# Patient Record
Sex: FEMALE | Race: WHITE | NOT HISPANIC OR LATINO
[De-identification: names, ages, dates, MRNs, and addresses within clinical notes are randomized per-mention and may not be internally consistent; named-entity substitution may affect disease eponyms.]

---

## 2017-02-14 ENCOUNTER — APPOINTMENT (OUTPATIENT)
Dept: OBGYN | Facility: CLINIC | Age: 27
End: 2017-02-14

## 2017-02-14 VITALS
DIASTOLIC BLOOD PRESSURE: 60 MMHG | SYSTOLIC BLOOD PRESSURE: 100 MMHG | HEIGHT: 59 IN | BODY MASS INDEX: 25.2 KG/M2 | WEIGHT: 125 LBS

## 2017-02-14 DIAGNOSIS — Z87.42 PERSONAL HISTORY OF OTHER DISEASES OF THE FEMALE GENITAL TRACT: ICD-10-CM

## 2017-02-15 LAB
C TRACH DNA SPEC QL NAA+PROBE: NORMAL
C TRACH RRNA SPEC QL NAA+PROBE: NORMAL
HBV SURFACE AG SER QL: NONREACTIVE
HCV AB SER QL: NONREACTIVE
HCV S/CO RATIO: 0.11 S/CO
HIV1+2 AB SPEC QL IA.RAPID: NONREACTIVE
N GONORRHOEA DNA SPEC QL NAA+PROBE: NORMAL
N GONORRHOEA RRNA SPEC QL NAA+PROBE: NORMAL
SOURCE AMPLIFICATION: NORMAL
SOURCE TP AMPLIFICATION: NORMAL
T PALLIDUM AB SER QL IA: NEGATIVE
T VAGINALIS RRNA SPEC QL NAA+PROBE: NEGATIVE

## 2017-02-19 LAB — CYTOLOGY CVX/VAG DOC THIN PREP: NORMAL

## 2017-05-06 ENCOUNTER — TRANSCRIPTION ENCOUNTER (OUTPATIENT)
Age: 27
End: 2017-05-06

## 2017-05-12 ENCOUNTER — TRANSCRIPTION ENCOUNTER (OUTPATIENT)
Age: 27
End: 2017-05-12

## 2017-08-15 ENCOUNTER — APPOINTMENT (OUTPATIENT)
Dept: OBGYN | Facility: CLINIC | Age: 27
End: 2017-08-15
Payer: MEDICAID

## 2017-08-15 VITALS
DIASTOLIC BLOOD PRESSURE: 56 MMHG | SYSTOLIC BLOOD PRESSURE: 100 MMHG | BODY MASS INDEX: 24.8 KG/M2 | HEIGHT: 59 IN | WEIGHT: 123 LBS

## 2017-08-15 DIAGNOSIS — Z01.419 ENCOUNTER FOR GYNECOLOGICAL EXAMINATION (GENERAL) (ROUTINE) W/OUT ABNORMAL FINDINGS: ICD-10-CM

## 2017-08-15 LAB
BILIRUB UR QL STRIP: NORMAL
GLUCOSE UR-MCNC: NORMAL
HCG UR QL: 0.2 EU/DL
HCG UR QL: POSITIVE
HGB UR QL STRIP.AUTO: NORMAL
KETONES UR-MCNC: NORMAL
LEUKOCYTE ESTERASE UR QL STRIP: NORMAL
NITRITE UR QL STRIP: NORMAL
PH UR STRIP: 7
PROT UR STRIP-MCNC: NORMAL
QUALITY CONTROL: YES
SP GR UR STRIP: 1.01

## 2017-08-15 PROCEDURE — 81003 URINALYSIS AUTO W/O SCOPE: CPT | Mod: QW

## 2017-08-15 PROCEDURE — 76817 TRANSVAGINAL US OBSTETRIC: CPT

## 2017-08-15 PROCEDURE — 81025 URINE PREGNANCY TEST: CPT

## 2017-08-15 PROCEDURE — 36415 COLL VENOUS BLD VENIPUNCTURE: CPT

## 2017-08-16 LAB
ABO + RH PNL BLD: NORMAL
BLD GP AB SCN SERPL QL: NORMAL
C TRACH RRNA SPEC QL NAA+PROBE: NORMAL
HCG SERPL-MCNC: NORMAL MIU/ML
N GONORRHOEA RRNA SPEC QL NAA+PROBE: NORMAL
PROGEST SERPL-MCNC: 18.1 NG/ML
SOURCE AMPLIFICATION: NORMAL

## 2017-08-17 LAB — BACTERIA UR CULT: ABNORMAL

## 2017-08-29 ENCOUNTER — APPOINTMENT (OUTPATIENT)
Dept: OBGYN | Facility: CLINIC | Age: 27
End: 2017-08-29
Payer: MEDICAID

## 2017-08-29 VITALS
SYSTOLIC BLOOD PRESSURE: 100 MMHG | DIASTOLIC BLOOD PRESSURE: 70 MMHG | HEIGHT: 59 IN | BODY MASS INDEX: 24.8 KG/M2 | WEIGHT: 123 LBS

## 2017-08-29 DIAGNOSIS — O36.80X1 PREGNANCY WITH INCONCLUSIVE FETAL VIABILITY, FETUS 1: ICD-10-CM

## 2017-08-29 LAB
BILIRUB UR QL STRIP: NORMAL
GLUCOSE UR-MCNC: NORMAL
HCG UR QL: 0.2 EU/DL
HGB UR QL STRIP.AUTO: NORMAL
KETONES UR-MCNC: NORMAL
LEUKOCYTE ESTERASE UR QL STRIP: NORMAL
NITRITE UR QL STRIP: NORMAL
PH UR STRIP: 6.5
PROT UR STRIP-MCNC: NORMAL
SP GR UR STRIP: 1.01

## 2017-08-29 PROCEDURE — 0501F PRENATAL FLOW SHEET: CPT

## 2017-08-29 PROCEDURE — 76817 TRANSVAGINAL US OBSTETRIC: CPT

## 2017-08-29 RX ORDER — AMOXICILLIN 500 MG/1
500 TABLET, FILM COATED ORAL
Qty: 14 | Refills: 0 | Status: DISCONTINUED | COMMUNITY
Start: 2017-08-17 | End: 2017-08-29

## 2017-08-30 PROBLEM — O36.80X1 ENCOUNTER TO DETERMINE FETAL VIABILITY OF PREGNANCY, FETUS 1: Status: RESOLVED | Noted: 2017-08-17 | Resolved: 2017-08-30

## 2017-09-15 ENCOUNTER — APPOINTMENT (OUTPATIENT)
Dept: OBGYN | Facility: CLINIC | Age: 27
End: 2017-09-15
Payer: MEDICAID

## 2017-09-15 VITALS
WEIGHT: 122 LBS | BODY MASS INDEX: 24.6 KG/M2 | DIASTOLIC BLOOD PRESSURE: 70 MMHG | HEIGHT: 59 IN | SYSTOLIC BLOOD PRESSURE: 120 MMHG

## 2017-09-15 DIAGNOSIS — Z36 ENCOUNTER FOR ANTENATAL SCREENING OF MOTHER: ICD-10-CM

## 2017-09-15 LAB
BILIRUB UR QL STRIP: NORMAL
GLUCOSE UR-MCNC: NORMAL
HCG UR QL: 0.2 EU/DL
HGB UR QL STRIP.AUTO: NORMAL
KETONES UR-MCNC: NORMAL
LEUKOCYTE ESTERASE UR QL STRIP: NORMAL
NITRITE UR QL STRIP: NORMAL
PH UR STRIP: 7
PROT UR STRIP-MCNC: NORMAL
SP GR UR STRIP: 1.01

## 2017-09-15 PROCEDURE — 76801 OB US < 14 WKS SINGLE FETUS: CPT

## 2017-09-15 PROCEDURE — 0502F SUBSEQUENT PRENATAL CARE: CPT

## 2017-09-19 ENCOUNTER — LABORATORY RESULT (OUTPATIENT)
Age: 27
End: 2017-09-19

## 2017-09-19 ENCOUNTER — APPOINTMENT (OUTPATIENT)
Dept: OBGYN | Facility: CLINIC | Age: 27
End: 2017-09-19
Payer: MEDICAID

## 2017-09-19 VITALS
BODY MASS INDEX: 24.8 KG/M2 | WEIGHT: 123 LBS | DIASTOLIC BLOOD PRESSURE: 58 MMHG | SYSTOLIC BLOOD PRESSURE: 92 MMHG | HEIGHT: 59 IN

## 2017-09-19 PROCEDURE — 76801 OB US < 14 WKS SINGLE FETUS: CPT

## 2017-09-19 PROCEDURE — 90471 IMMUNIZATION ADMIN: CPT

## 2017-09-19 PROCEDURE — 90656 IIV3 VACC NO PRSV 0.5 ML IM: CPT

## 2017-09-19 PROCEDURE — 36415 COLL VENOUS BLD VENIPUNCTURE: CPT

## 2017-09-19 PROCEDURE — 0502F SUBSEQUENT PRENATAL CARE: CPT

## 2017-09-20 LAB
ABO + RH PNL BLD: NORMAL
B19V IGG SER QL IA: 8 INDEX
B19V IGG+IGM SER-IMP: NORMAL
B19V IGG+IGM SER-IMP: POSITIVE
B19V IGM FLD-ACNC: 0.2 INDEX
B19V IGM SER-ACNC: NEGATIVE
BASOPHILS # BLD AUTO: 0.01 K/UL
BASOPHILS NFR BLD AUTO: 0.1 %
BLD GP AB SCN SERPL QL: ABNORMAL
EOSINOPHIL # BLD AUTO: 0.04 K/UL
EOSINOPHIL NFR BLD AUTO: 0.5 %
HBV SURFACE AG SERPL QL IA: NONREACTIVE
HCT VFR BLD CALC: 37.9 %
HCV AB SER QL: NONREACTIVE
HCV S/CO RATIO: 0.08 S/CO
HGB BLD-MCNC: 12.9 G/DL
HIV1+2 AB SPEC QL IA.RAPID: NONREACTIVE
IMM GRANULOCYTES NFR BLD AUTO: 0.1 %
LYMPHOCYTES # BLD AUTO: 1.7 K/UL
LYMPHOCYTES NFR BLD AUTO: 22.3 %
MAN DIFF?: NORMAL
MCHC RBC-ENTMCNC: 27.9 PG
MCHC RBC-ENTMCNC: 34 GM/DL
MCV RBC AUTO: 81.9 FL
MONOCYTES # BLD AUTO: 0.53 K/UL
MONOCYTES NFR BLD AUTO: 6.9 %
NEUTROPHILS # BLD AUTO: 5.35 K/UL
NEUTROPHILS NFR BLD AUTO: 70.1 %
PLATELET # BLD AUTO: 283 K/UL
RBC # BLD: 4.63 M/UL
RBC # FLD: 14.3 %
RUBV IGG FLD-ACNC: 1.6 INDEX
RUBV IGG SER-IMP: POSITIVE
T PALLIDUM AB SER QL IA: NEGATIVE
TSH SERPL-ACNC: 1.45 UIU/ML
VZV AB TITR SER: POSITIVE
VZV IGG SER IF-ACNC: 3411 INDEX
WBC # FLD AUTO: 7.64 K/UL

## 2017-09-21 LAB
HGB A MFR BLD: 97.3 %
HGB A2 MFR BLD: 2.7 %
HGB FRACT BLD-IMP: NORMAL

## 2017-09-22 ENCOUNTER — APPOINTMENT (OUTPATIENT)
Dept: OBGYN | Facility: CLINIC | Age: 27
End: 2017-09-22
Payer: MEDICAID

## 2017-09-22 LAB — LEAD BLD-MCNC: <1 UG/DL

## 2017-09-22 PROCEDURE — 0502F SUBSEQUENT PRENATAL CARE: CPT

## 2017-09-25 ENCOUNTER — RESULT REVIEW (OUTPATIENT)
Age: 27
End: 2017-09-25

## 2017-09-25 LAB — BACTERIA UR CULT: ABNORMAL

## 2017-09-26 ENCOUNTER — APPOINTMENT (OUTPATIENT)
Dept: OBGYN | Facility: CLINIC | Age: 27
End: 2017-09-26
Payer: MEDICAID

## 2017-09-26 PROCEDURE — 0502F SUBSEQUENT PRENATAL CARE: CPT

## 2017-09-27 LAB — BACTERIA UR CULT: ABNORMAL

## 2017-10-05 ENCOUNTER — INPATIENT (INPATIENT)
Facility: HOSPITAL | Age: 27
LOS: 0 days | Discharge: HOME | End: 2017-10-06
Attending: OBSTETRICS & GYNECOLOGY | Admitting: OBSTETRICS & GYNECOLOGY
Payer: MEDICAID

## 2017-10-05 ENCOUNTER — APPOINTMENT (OUTPATIENT)
Dept: OBGYN | Facility: CLINIC | Age: 27
End: 2017-10-05
Payer: MEDICAID

## 2017-10-05 VITALS
RESPIRATION RATE: 16 BRPM | SYSTOLIC BLOOD PRESSURE: 115 MMHG | HEART RATE: 99 BPM | OXYGEN SATURATION: 100 % | TEMPERATURE: 98 F | DIASTOLIC BLOOD PRESSURE: 71 MMHG

## 2017-10-05 DIAGNOSIS — N12 TUBULO-INTERSTITIAL NEPHRITIS, NOT SPECIFIED AS ACUTE OR CHRONIC: ICD-10-CM

## 2017-10-05 LAB
ALBUMIN SERPL ELPH-MCNC: 3.7 G/DL — SIGNIFICANT CHANGE UP (ref 3.3–5)
ALP SERPL-CCNC: 48 U/L — SIGNIFICANT CHANGE UP (ref 40–120)
ALT FLD-CCNC: 16 U/L — SIGNIFICANT CHANGE UP (ref 4–33)
ANTIBODY ID 1_1: SIGNIFICANT CHANGE UP
APPEARANCE UR: CLEAR — SIGNIFICANT CHANGE UP
AST SERPL-CCNC: 17 U/L — SIGNIFICANT CHANGE UP (ref 4–32)
BACTERIA # UR AUTO: SIGNIFICANT CHANGE UP
BASOPHILS # BLD AUTO: 0.02 K/UL — SIGNIFICANT CHANGE UP (ref 0–0.2)
BASOPHILS NFR BLD AUTO: 0.2 % — SIGNIFICANT CHANGE UP (ref 0–2)
BILIRUB SERPL-MCNC: 0.2 MG/DL — SIGNIFICANT CHANGE UP (ref 0.2–1.2)
BILIRUB UR-MCNC: NEGATIVE — SIGNIFICANT CHANGE UP
BLD GP AB SCN SERPL QL: POSITIVE — SIGNIFICANT CHANGE UP
BLOOD UR QL VISUAL: HIGH
BUN SERPL-MCNC: 7 MG/DL — SIGNIFICANT CHANGE UP (ref 7–23)
CALCIUM SERPL-MCNC: 8.3 MG/DL — LOW (ref 8.4–10.5)
CHLORIDE SERPL-SCNC: 105 MMOL/L — SIGNIFICANT CHANGE UP (ref 98–107)
CO2 SERPL-SCNC: 23 MMOL/L — SIGNIFICANT CHANGE UP (ref 22–31)
COLOR SPEC: SIGNIFICANT CHANGE UP
CREAT SERPL-MCNC: 0.48 MG/DL — LOW (ref 0.5–1.3)
DAT POLY-SP REAG RBC QL: NEGATIVE — SIGNIFICANT CHANGE UP
EOSINOPHIL # BLD AUTO: 0.03 K/UL — SIGNIFICANT CHANGE UP (ref 0–0.5)
EOSINOPHIL NFR BLD AUTO: 0.4 % — SIGNIFICANT CHANGE UP (ref 0–6)
GLUCOSE SERPL-MCNC: 101 MG/DL — HIGH (ref 70–99)
GLUCOSE UR-MCNC: NEGATIVE — SIGNIFICANT CHANGE UP
HCG SERPL-ACNC: SIGNIFICANT CHANGE UP MIU/ML
HCT VFR BLD CALC: 36.3 % — SIGNIFICANT CHANGE UP (ref 34.5–45)
HGB BLD-MCNC: 12.5 G/DL — SIGNIFICANT CHANGE UP (ref 11.5–15.5)
IMM GRANULOCYTES # BLD AUTO: 0.02 # — SIGNIFICANT CHANGE UP
IMM GRANULOCYTES NFR BLD AUTO: 0.2 % — SIGNIFICANT CHANGE UP (ref 0–1.5)
KETONES UR-MCNC: NEGATIVE — SIGNIFICANT CHANGE UP
LEUKOCYTE ESTERASE UR-ACNC: SIGNIFICANT CHANGE UP
LYMPHOCYTES # BLD AUTO: 1.57 K/UL — SIGNIFICANT CHANGE UP (ref 1–3.3)
LYMPHOCYTES # BLD AUTO: 19.5 % — SIGNIFICANT CHANGE UP (ref 13–44)
MCHC RBC-ENTMCNC: 28.5 PG — SIGNIFICANT CHANGE UP (ref 27–34)
MCHC RBC-ENTMCNC: 34.4 % — SIGNIFICANT CHANGE UP (ref 32–36)
MCV RBC AUTO: 82.7 FL — SIGNIFICANT CHANGE UP (ref 80–100)
MONOCYTES # BLD AUTO: 0.46 K/UL — SIGNIFICANT CHANGE UP (ref 0–0.9)
MONOCYTES NFR BLD AUTO: 5.7 % — SIGNIFICANT CHANGE UP (ref 2–14)
NEUTROPHILS # BLD AUTO: 5.95 K/UL — SIGNIFICANT CHANGE UP (ref 1.8–7.4)
NEUTROPHILS NFR BLD AUTO: 74 % — SIGNIFICANT CHANGE UP (ref 43–77)
NITRITE UR-MCNC: NEGATIVE — SIGNIFICANT CHANGE UP
NRBC # FLD: 0 — SIGNIFICANT CHANGE UP
PH UR: 7 — SIGNIFICANT CHANGE UP (ref 4.6–8)
PLATELET # BLD AUTO: 266 K/UL — SIGNIFICANT CHANGE UP (ref 150–400)
PMV BLD: 10 FL — SIGNIFICANT CHANGE UP (ref 7–13)
POTASSIUM SERPL-MCNC: 3.3 MMOL/L — LOW (ref 3.5–5.3)
POTASSIUM SERPL-SCNC: 3.3 MMOL/L — LOW (ref 3.5–5.3)
PROT SERPL-MCNC: 6.3 G/DL — SIGNIFICANT CHANGE UP (ref 6–8.3)
PROT UR-MCNC: NEGATIVE — SIGNIFICANT CHANGE UP
RBC # BLD: 4.39 M/UL — SIGNIFICANT CHANGE UP (ref 3.8–5.2)
RBC # FLD: 13.8 % — SIGNIFICANT CHANGE UP (ref 10.3–14.5)
RBC CASTS # UR COMP ASSIST: SIGNIFICANT CHANGE UP (ref 0–?)
RH IG SCN BLD-IMP: NEGATIVE — SIGNIFICANT CHANGE UP
SODIUM SERPL-SCNC: 139 MMOL/L — SIGNIFICANT CHANGE UP (ref 135–145)
SP GR SPEC: 1 — LOW (ref 1–1.03)
SQUAMOUS # UR AUTO: SIGNIFICANT CHANGE UP
UROBILINOGEN FLD QL: NORMAL E.U. — SIGNIFICANT CHANGE UP (ref 0.1–0.2)
WBC # BLD: 8.05 K/UL — SIGNIFICANT CHANGE UP (ref 3.8–10.5)
WBC # FLD AUTO: 8.05 K/UL — SIGNIFICANT CHANGE UP (ref 3.8–10.5)
WBC UR QL: SIGNIFICANT CHANGE UP (ref 0–?)

## 2017-10-05 PROCEDURE — 76775 US EXAM ABDO BACK WALL LIM: CPT | Mod: 26

## 2017-10-05 PROCEDURE — 99211 OFF/OP EST MAY X REQ PHY/QHP: CPT

## 2017-10-05 PROCEDURE — 76805 OB US >/= 14 WKS SNGL FETUS: CPT | Mod: 26

## 2017-10-05 PROCEDURE — 86077 PHYS BLOOD BANK SERV XMATCH: CPT

## 2017-10-05 RX ORDER — CEFTRIAXONE 500 MG/1
1 INJECTION, POWDER, FOR SOLUTION INTRAMUSCULAR; INTRAVENOUS EVERY 24 HOURS
Qty: 0 | Refills: 0 | Status: DISCONTINUED | OUTPATIENT
Start: 2017-10-06 | End: 2017-10-06

## 2017-10-05 RX ORDER — CEFTRIAXONE 500 MG/1
1 INJECTION, POWDER, FOR SOLUTION INTRAMUSCULAR; INTRAVENOUS ONCE
Qty: 0 | Refills: 0 | Status: COMPLETED | OUTPATIENT
Start: 2017-10-05 | End: 2017-10-05

## 2017-10-05 RX ORDER — CEFTRIAXONE 500 MG/1
INJECTION, POWDER, FOR SOLUTION INTRAMUSCULAR; INTRAVENOUS
Qty: 0 | Refills: 0 | Status: DISCONTINUED | OUTPATIENT
Start: 2017-10-06 | End: 2017-10-06

## 2017-10-05 RX ORDER — POTASSIUM CHLORIDE 20 MEQ
40 PACKET (EA) ORAL ONCE
Qty: 0 | Refills: 0 | Status: COMPLETED | OUTPATIENT
Start: 2017-10-05 | End: 2017-10-05

## 2017-10-05 RX ORDER — SODIUM CHLORIDE 9 MG/ML
2000 INJECTION INTRAMUSCULAR; INTRAVENOUS; SUBCUTANEOUS ONCE
Qty: 0 | Refills: 0 | Status: COMPLETED | OUTPATIENT
Start: 2017-10-05 | End: 2017-10-05

## 2017-10-05 RX ORDER — ACETAMINOPHEN 500 MG
975 TABLET ORAL ONCE
Qty: 0 | Refills: 0 | Status: COMPLETED | OUTPATIENT
Start: 2017-10-05 | End: 2017-10-05

## 2017-10-05 RX ADMIN — Medication 975 MILLIGRAM(S): at 22:11

## 2017-10-05 RX ADMIN — Medication 40 MILLIEQUIVALENT(S): at 15:25

## 2017-10-05 RX ADMIN — CEFTRIAXONE 100 GRAM(S): 500 INJECTION, POWDER, FOR SOLUTION INTRAMUSCULAR; INTRAVENOUS at 15:25

## 2017-10-05 RX ADMIN — SODIUM CHLORIDE 1000 MILLILITER(S): 9 INJECTION INTRAMUSCULAR; INTRAVENOUS; SUBCUTANEOUS at 14:12

## 2017-10-05 RX ADMIN — Medication 975 MILLIGRAM(S): at 22:41

## 2017-10-05 NOTE — CONSULT NOTE ADULT - ATTENDING COMMENTS
Late entry: Pt evaluated at approximately 5pm  Pt presents with unilateral back pain and blood when urinates and 12+w gestation.  Has histroy of UTIs and pyelo  Exam +right flank pain  speculum - os appeared closed, cervical vs vaginal polyp noted   TVS per radiology - +IUP, short cervix with funneling 1.3cm  Admit for pyelo  MFM consult in am for short cervix

## 2017-10-05 NOTE — ED PROVIDER NOTE - OBJECTIVE STATEMENT
Dr. Schultz: 26F  13 weeks pregnant sent in from obgyn (Dr. Kebede) for r/o pyelo. Pt had R flank pain and dysuria 3 weeks ago, was given Macrobid x 7 days with no improvement in sx. Abx switched to amoxicillin x 7 days, still no relief. Also c/o 2-3 episodes of NBNB, and hematuria vs vag spotting. Not changing pads, no clots. No fevers or chills.

## 2017-10-05 NOTE — ED ADULT NURSE NOTE - OBJECTIVE STATEMENT
Alert and oriented x 4. Pt received to intake due to vaginal bleeding. Pt denies saturating pads. No chest pain, shortness of breath, nasuea, vomiting or dizziness.  IV 20g to right AC. Labs drawn. VSS. Will continue to monitor.

## 2017-10-05 NOTE — ED PROVIDER NOTE - PROGRESS NOTE DETAILS
Dr. Schultz: Discussed with obgyn resident, pt may need outpt cerclage. Pt's OB attending in the OR, will be down shortly to see patient. Per obgyn resident pt may be dc'ed or CDU, admission unlikely. D/w pt, pt comfortable with CDU. D/w CDU, pt likely to go to CDU if acceptable by obgyn attending.   I had a detailed discussion with pt and her family about her results, and they expressed understanding.

## 2017-10-05 NOTE — CONSULT NOTE ADULT - SUBJECTIVE AND OBJECTIVE BOX
HPI: 26y G1 @12wk6d (RENETTA 4/13/18) presents from the office to rule out pyelonephritis. Pt has had recurrent UTIs throughout her life. This is her third UTI since pregnancy. She has been experiencing symptoms -dysuria, hematuria, flank pain - x 2 weeks. Pt initially took Macrobid without any symptom resolution. She was subsequently placed on Amoxicillin with minimal improvement. Pt also notes that she has had vaginal spotting x several days duration. She denies any associated abdominal pain. She denies any fever/chills, dizziness/lightheadedness, SOB, CP, vomiting. She notes nightly nausea but is able to tolerate PO.    OB/GYN HISTORY:     Name of GYN Physician: Dr. Kebede     OBGYN: +cysts, +HSV, -fibroids, -abn PAP  PMH: recurrent UTIs, pyelo 2015 w/hospital admission for IV ABX  PSH: denies  Meds: PNV, s/p Amoxicillin, s/p Macrobid  Allergies: denies  Social: denies x 3  FHx: UTIs, HTN    ROS wnl, except as per HPI    Vital Signs Last 24 Hrs  T(C): 36.7 (05 Oct 2017 11:45), Max: 36.7 (05 Oct 2017 11:45)  T(F): 98.1 (05 Oct 2017 11:45), Max: 98.1 (05 Oct 2017 11:45)  HR: 99 (05 Oct 2017 11:45) (99 - 99)  BP: 115/71 (05 Oct 2017 11:45) (115/71 - 115/71)  BP(mean): --  RR: 16 (05 Oct 2017 11:45) (16 - 16)  SpO2: 100% (05 Oct 2017 11:45) (100% - 100%)    Physical Exam:  Gen:AAOx3, NAD  CV: RRR  Pulm:CTAB/L  Abd: soft, NT, ND  Back: +R-sided flank pain  Gyn: mass protruding from external os - unable to be removed at bedside; min amount of dark red blood in vaginal vault; cervix open with mass inside cervical canal; no adnexal tenderness; fundus palpated at 12 wks size  Ext: NTB/L    LABS:                RADIOLOGY & ADDITIONAL STUDIES:      A/P:

## 2017-10-06 ENCOUNTER — ASOB RESULT (OUTPATIENT)
Age: 27
End: 2017-10-06

## 2017-10-06 ENCOUNTER — APPOINTMENT (OUTPATIENT)
Dept: ANTEPARTUM | Facility: CLINIC | Age: 27
End: 2017-10-06
Payer: MEDICAID

## 2017-10-06 ENCOUNTER — TRANSCRIPTION ENCOUNTER (OUTPATIENT)
Age: 27
End: 2017-10-06

## 2017-10-06 VITALS
HEART RATE: 77 BPM | TEMPERATURE: 98 F | OXYGEN SATURATION: 98 % | RESPIRATION RATE: 17 BRPM | DIASTOLIC BLOOD PRESSURE: 57 MMHG | SYSTOLIC BLOOD PRESSURE: 98 MMHG

## 2017-10-06 LAB — SPECIMEN SOURCE: SIGNIFICANT CHANGE UP

## 2017-10-06 PROCEDURE — 76813 OB US NUCHAL MEAS 1 GEST: CPT | Mod: 26

## 2017-10-06 PROCEDURE — 76817 TRANSVAGINAL US OBSTETRIC: CPT | Mod: 26

## 2017-10-06 NOTE — DISCHARGE NOTE ANTEPARTUM - HOSPITAL COURSE
Patient admitted from ED with concern for pyelonephritis, workup negative. TVUS revealing cervical polyp encompassing the length of the canal. Patient d/c home HD#2 with close interval follow up with Dr. Kebede.

## 2017-10-06 NOTE — DISCHARGE NOTE ANTEPARTUM - CARE PLAN
Principal Discharge DX:	Cervical polyp  Goal:	Home  Instructions for follow-up, activity and diet:	Normal diet and activity as tolerated

## 2017-10-06 NOTE — DISCHARGE NOTE ANTEPARTUM - CARE PROVIDER_API CALL
Chuy Kebede), Obstetrics and Gynecology  [address] [facility]  [address]nd [place] [address]  Phone: [phone]  Fax: [phone]

## 2017-10-06 NOTE — DISCHARGE NOTE ANTEPARTUM - PATIENT PORTAL LINK FT
“You can access the [employer] Patient Portal, offered by [facility], by registering with the following website: [email or web address]”

## 2017-10-07 LAB
-  AMIKACIN: SIGNIFICANT CHANGE UP
-  AMPICILLIN/SULBACTAM: SIGNIFICANT CHANGE UP
-  AMPICILLIN: SIGNIFICANT CHANGE UP
-  AZTREONAM: SIGNIFICANT CHANGE UP
-  CEFAZOLIN: SIGNIFICANT CHANGE UP
-  CEFEPIME: SIGNIFICANT CHANGE UP
-  CEFOXITIN: SIGNIFICANT CHANGE UP
-  CEFTAZIDIME: SIGNIFICANT CHANGE UP
-  CEFTRIAXONE: SIGNIFICANT CHANGE UP
-  CIPROFLOXACIN: SIGNIFICANT CHANGE UP
-  ERTAPENEM: SIGNIFICANT CHANGE UP
-  GENTAMICIN: SIGNIFICANT CHANGE UP
-  IMIPENEM: SIGNIFICANT CHANGE UP
-  LEVOFLOXACIN: SIGNIFICANT CHANGE UP
-  MEROPENEM: SIGNIFICANT CHANGE UP
-  NITROFURANTOIN: SIGNIFICANT CHANGE UP
-  PIPERACILLIN/TAZOBACTAM: SIGNIFICANT CHANGE UP
-  TIGECYCLINE: SIGNIFICANT CHANGE UP
-  TOBRAMYCIN: SIGNIFICANT CHANGE UP
-  TRIMETHOPRIM/SULFAMETHOXAZOLE: SIGNIFICANT CHANGE UP
BACTERIA UR CULT: SIGNIFICANT CHANGE UP
METHOD TYPE: SIGNIFICANT CHANGE UP
ORGANISM # SPEC MICROSCOPIC CNT: SIGNIFICANT CHANGE UP
ORGANISM # SPEC MICROSCOPIC CNT: SIGNIFICANT CHANGE UP

## 2017-10-08 LAB — BACTERIA UR CULT: ABNORMAL

## 2017-10-17 ENCOUNTER — APPOINTMENT (OUTPATIENT)
Dept: OBGYN | Facility: CLINIC | Age: 27
End: 2017-10-17
Payer: MEDICAID

## 2017-10-17 VITALS
SYSTOLIC BLOOD PRESSURE: 116 MMHG | HEIGHT: 59 IN | BODY MASS INDEX: 25.4 KG/M2 | DIASTOLIC BLOOD PRESSURE: 68 MMHG | WEIGHT: 126 LBS

## 2017-10-17 DIAGNOSIS — Z34.91 ENCOUNTER FOR SUPERVISION OF NORMAL PREGNANCY, UNSPECIFIED, FIRST TRIMESTER: ICD-10-CM

## 2017-10-17 LAB
BILIRUB UR QL STRIP: NORMAL
GLUCOSE UR-MCNC: NORMAL
HCG UR QL: 0.2 EU/DL
HGB UR QL STRIP.AUTO: NORMAL
KETONES UR-MCNC: NORMAL
LEUKOCYTE ESTERASE UR QL STRIP: NORMAL
NITRITE UR QL STRIP: NORMAL
PH UR STRIP: 6.5
PROT UR STRIP-MCNC: NORMAL
SP GR UR STRIP: 1

## 2017-10-17 PROCEDURE — 0502F SUBSEQUENT PRENATAL CARE: CPT

## 2017-10-17 RX ORDER — AMOXICILLIN 500 MG/1
500 TABLET, FILM COATED ORAL
Qty: 14 | Refills: 0 | Status: DISCONTINUED | COMMUNITY
Start: 2017-09-28 | End: 2017-10-17

## 2017-10-17 RX ORDER — CEPHALEXIN 500 MG/1
500 CAPSULE ORAL EVERY 8 HOURS
Qty: 21 | Refills: 0 | Status: DISCONTINUED | COMMUNITY
Start: 2017-10-09 | End: 2017-10-17

## 2017-10-20 LAB — BACTERIA UR CULT: NORMAL

## 2017-10-26 ENCOUNTER — APPOINTMENT (OUTPATIENT)
Dept: ANTEPARTUM | Facility: CLINIC | Age: 27
End: 2017-10-26
Payer: MEDICAID

## 2017-10-26 ENCOUNTER — ASOB RESULT (OUTPATIENT)
Age: 27
End: 2017-10-26

## 2017-10-26 PROCEDURE — 76815 OB US LIMITED FETUS(S): CPT

## 2017-10-26 PROCEDURE — 76817 TRANSVAGINAL US OBSTETRIC: CPT

## 2017-10-28 ENCOUNTER — EMERGENCY (EMERGENCY)
Facility: HOSPITAL | Age: 27
LOS: 1 days | Discharge: STILL ADMITTED OR ADMINISTRATIVE | End: 2017-10-28
Admitting: EMERGENCY MEDICINE

## 2017-10-28 ENCOUNTER — OUTPATIENT (OUTPATIENT)
Dept: OUTPATIENT SERVICES | Facility: HOSPITAL | Age: 27
LOS: 1 days | End: 2017-10-28

## 2017-10-28 VITALS
DIASTOLIC BLOOD PRESSURE: 75 MMHG | RESPIRATION RATE: 16 BRPM | SYSTOLIC BLOOD PRESSURE: 106 MMHG | HEART RATE: 98 BPM | OXYGEN SATURATION: 99 % | TEMPERATURE: 98 F

## 2017-10-28 DIAGNOSIS — O26.899 OTHER SPECIFIED PREGNANCY RELATED CONDITIONS, UNSPECIFIED TRIMESTER: ICD-10-CM

## 2017-10-28 NOTE — ED ADULT TRIAGE NOTE - CHIEF COMPLAINT QUOTE
patient states that she is 16 weeks pregnant c/o vaginal bleeding tonight with blood clots. LMP 7/7/17, due date is 4/13/18

## 2017-10-30 ENCOUNTER — EMERGENCY (EMERGENCY)
Facility: HOSPITAL | Age: 27
LOS: 1 days | Discharge: STILL ADMITTED OR ADMINISTRATIVE | End: 2017-10-30
Admitting: EMERGENCY MEDICINE

## 2017-10-30 ENCOUNTER — INPATIENT (INPATIENT)
Facility: HOSPITAL | Age: 27
LOS: 1 days | Discharge: HOME | End: 2017-11-01
Attending: OBSTETRICS & GYNECOLOGY | Admitting: OBSTETRICS & GYNECOLOGY
Payer: MEDICAID

## 2017-10-30 ENCOUNTER — APPOINTMENT (OUTPATIENT)
Dept: ANTEPARTUM | Facility: HOSPITAL | Age: 27
End: 2017-10-30
Payer: MEDICAID

## 2017-10-30 ENCOUNTER — ASOB RESULT (OUTPATIENT)
Age: 27
End: 2017-10-30

## 2017-10-30 VITALS — WEIGHT: 127.87 LBS | HEIGHT: 59 IN

## 2017-10-30 VITALS
RESPIRATION RATE: 15 BRPM | DIASTOLIC BLOOD PRESSURE: 80 MMHG | TEMPERATURE: 98 F | HEART RATE: 103 BPM | OXYGEN SATURATION: 98 % | SYSTOLIC BLOOD PRESSURE: 122 MMHG

## 2017-10-30 DIAGNOSIS — O26.899 OTHER SPECIFIED PREGNANCY RELATED CONDITIONS, UNSPECIFIED TRIMESTER: ICD-10-CM

## 2017-10-30 LAB
ANTIBODY ID 1_1: SIGNIFICANT CHANGE UP
APTT BLD: 28.6 SEC — SIGNIFICANT CHANGE UP (ref 27.5–37.4)
BASOPHILS # BLD AUTO: 0.02 K/UL — SIGNIFICANT CHANGE UP (ref 0–0.2)
BASOPHILS NFR BLD AUTO: 0.2 % — SIGNIFICANT CHANGE UP (ref 0–2)
BLD GP AB SCN SERPL QL: POSITIVE — SIGNIFICANT CHANGE UP
DAT POLY-SP REAG RBC QL: NEGATIVE — SIGNIFICANT CHANGE UP
EOSINOPHIL # BLD AUTO: 0.1 K/UL — SIGNIFICANT CHANGE UP (ref 0–0.5)
EOSINOPHIL NFR BLD AUTO: 1.2 % — SIGNIFICANT CHANGE UP (ref 0–6)
FIBRINOGEN PPP-MCNC: 614.5 MG/DL — HIGH (ref 310–510)
HCT VFR BLD CALC: 31.9 % — LOW (ref 34.5–45)
HGB BLD-MCNC: 10.8 G/DL — LOW (ref 11.5–15.5)
IMM GRANULOCYTES # BLD AUTO: 0.02 # — SIGNIFICANT CHANGE UP
IMM GRANULOCYTES NFR BLD AUTO: 0.2 % — SIGNIFICANT CHANGE UP (ref 0–1.5)
INR BLD: 1 — SIGNIFICANT CHANGE UP (ref 0.88–1.17)
LYMPHOCYTES # BLD AUTO: 2.02 K/UL — SIGNIFICANT CHANGE UP (ref 1–3.3)
LYMPHOCYTES # BLD AUTO: 23.4 % — SIGNIFICANT CHANGE UP (ref 13–44)
MCHC RBC-ENTMCNC: 28.6 PG — SIGNIFICANT CHANGE UP (ref 27–34)
MCHC RBC-ENTMCNC: 33.9 % — SIGNIFICANT CHANGE UP (ref 32–36)
MCV RBC AUTO: 84.4 FL — SIGNIFICANT CHANGE UP (ref 80–100)
MONOCYTES # BLD AUTO: 0.49 K/UL — SIGNIFICANT CHANGE UP (ref 0–0.9)
MONOCYTES NFR BLD AUTO: 5.7 % — SIGNIFICANT CHANGE UP (ref 2–14)
NEUTROPHILS # BLD AUTO: 6 K/UL — SIGNIFICANT CHANGE UP (ref 1.8–7.4)
NEUTROPHILS NFR BLD AUTO: 69.3 % — SIGNIFICANT CHANGE UP (ref 43–77)
NRBC # FLD: 0 — SIGNIFICANT CHANGE UP
PLATELET # BLD AUTO: 270 K/UL — SIGNIFICANT CHANGE UP (ref 150–400)
PMV BLD: 10.4 FL — SIGNIFICANT CHANGE UP (ref 7–13)
PROTHROM AB SERPL-ACNC: 11.2 SEC — SIGNIFICANT CHANGE UP (ref 9.8–13.1)
RBC # BLD: 3.78 M/UL — LOW (ref 3.8–5.2)
RBC # FLD: 14.1 % — SIGNIFICANT CHANGE UP (ref 10.3–14.5)
RH IG SCN BLD-IMP: NEGATIVE — SIGNIFICANT CHANGE UP
WBC # BLD: 8.65 K/UL — SIGNIFICANT CHANGE UP (ref 3.8–10.5)
WBC # FLD AUTO: 8.65 K/UL — SIGNIFICANT CHANGE UP (ref 3.8–10.5)

## 2017-10-30 PROCEDURE — 76817 TRANSVAGINAL US OBSTETRIC: CPT | Mod: 26

## 2017-10-30 PROCEDURE — 86077 PHYS BLOOD BANK SERV XMATCH: CPT

## 2017-10-30 PROCEDURE — 76815 OB US LIMITED FETUS(S): CPT | Mod: 26

## 2017-10-30 PROCEDURE — 99231 SBSQ HOSP IP/OBS SF/LOW 25: CPT | Mod: 25

## 2017-10-30 NOTE — ED ADULT TRIAGE NOTE - CHIEF COMPLAINT QUOTE
Pt c/o approx 16 weeks pregnant with vaginal bleeding and abdominal cramping.  RENETTA 4/13/18. Spoke with Laura in L&D, pt to go to L&D.

## 2017-10-31 ENCOUNTER — TRANSCRIPTION ENCOUNTER (OUTPATIENT)
Age: 27
End: 2017-10-31

## 2017-10-31 ENCOUNTER — RESULT REVIEW (OUTPATIENT)
Age: 27
End: 2017-10-31

## 2017-10-31 LAB
APTT BLD: 29.3 SEC — SIGNIFICANT CHANGE UP (ref 27.5–37.4)
BLD GP AB SCN SERPL QL: POSITIVE — SIGNIFICANT CHANGE UP
HCT VFR BLD CALC: 30.7 % — LOW (ref 34.5–45)
HGB BLD-MCNC: 10.5 G/DL — LOW (ref 11.5–15.5)
INR BLD: 1.01 — SIGNIFICANT CHANGE UP (ref 0.88–1.17)
MCHC RBC-ENTMCNC: 28.8 PG — SIGNIFICANT CHANGE UP (ref 27–34)
MCHC RBC-ENTMCNC: 34.2 % — SIGNIFICANT CHANGE UP (ref 32–36)
MCV RBC AUTO: 84.3 FL — SIGNIFICANT CHANGE UP (ref 80–100)
NRBC # FLD: 0 — SIGNIFICANT CHANGE UP
PLATELET # BLD AUTO: 230 K/UL — SIGNIFICANT CHANGE UP (ref 150–400)
PMV BLD: 10 FL — SIGNIFICANT CHANGE UP (ref 7–13)
PROTHROM AB SERPL-ACNC: 11.3 SEC — SIGNIFICANT CHANGE UP (ref 9.8–13.1)
RBC # BLD: 3.64 M/UL — LOW (ref 3.8–5.2)
RBC # FLD: 14.4 % — SIGNIFICANT CHANGE UP (ref 10.3–14.5)
RH IG SCN BLD-IMP: NEGATIVE — SIGNIFICANT CHANGE UP
WBC # BLD: 7.94 K/UL — SIGNIFICANT CHANGE UP (ref 3.8–10.5)
WBC # FLD AUTO: 7.94 K/UL — SIGNIFICANT CHANGE UP (ref 3.8–10.5)

## 2017-10-31 PROCEDURE — 88305 TISSUE EXAM BY PATHOLOGIST: CPT | Mod: 26

## 2017-10-31 PROCEDURE — 57452 EXAM OF CERVIX W/SCOPE: CPT

## 2017-10-31 PROCEDURE — 59320 REVISION OF CERVIX: CPT

## 2017-10-31 RX ORDER — INDOMETHACIN 50 MG
25 CAPSULE ORAL EVERY 6 HOURS
Qty: 0 | Refills: 0 | Status: DISCONTINUED | OUTPATIENT
Start: 2017-10-31 | End: 2017-11-01

## 2017-10-31 RX ORDER — METOCLOPRAMIDE HCL 10 MG
10 TABLET ORAL ONCE
Qty: 0 | Refills: 0 | Status: COMPLETED | OUTPATIENT
Start: 2017-10-31 | End: 2017-10-31

## 2017-10-31 RX ORDER — SODIUM CHLORIDE 9 MG/ML
1000 INJECTION, SOLUTION INTRAVENOUS
Qty: 0 | Refills: 0 | Status: DISCONTINUED | OUTPATIENT
Start: 2017-10-31 | End: 2017-11-01

## 2017-10-31 RX ORDER — FAMOTIDINE 10 MG/ML
20 INJECTION INTRAVENOUS ONCE
Qty: 0 | Refills: 0 | Status: COMPLETED | OUTPATIENT
Start: 2017-10-31 | End: 2017-10-31

## 2017-10-31 RX ORDER — CITRIC ACID/SODIUM CITRATE 300-500 MG
30 SOLUTION, ORAL ORAL ONCE
Qty: 0 | Refills: 0 | Status: COMPLETED | OUTPATIENT
Start: 2017-10-31 | End: 2017-10-31

## 2017-10-31 RX ORDER — INDOMETHACIN 50 MG
50 CAPSULE ORAL ONCE
Qty: 0 | Refills: 0 | Status: COMPLETED | OUTPATIENT
Start: 2017-10-31 | End: 2017-10-31

## 2017-10-31 RX ADMIN — Medication 50 MILLIGRAM(S): at 18:13

## 2017-10-31 RX ADMIN — Medication 10 MILLIGRAM(S): at 14:51

## 2017-10-31 RX ADMIN — Medication 50 MILLIGRAM(S): at 18:09

## 2017-10-31 RX ADMIN — FAMOTIDINE 20 MILLIGRAM(S): 10 INJECTION INTRAVENOUS at 14:51

## 2017-10-31 RX ADMIN — SODIUM CHLORIDE 125 MILLILITER(S): 9 INJECTION, SOLUTION INTRAVENOUS at 14:52

## 2017-10-31 RX ADMIN — Medication 30 MILLILITER(S): at 15:02

## 2017-11-01 ENCOUNTER — TRANSCRIPTION ENCOUNTER (OUTPATIENT)
Age: 27
End: 2017-11-01

## 2017-11-01 ENCOUNTER — APPOINTMENT (OUTPATIENT)
Dept: ANTEPARTUM | Facility: HOSPITAL | Age: 27
End: 2017-11-01
Payer: MEDICAID

## 2017-11-01 VITALS
DIASTOLIC BLOOD PRESSURE: 60 MMHG | TEMPERATURE: 98 F | HEART RATE: 103 BPM | RESPIRATION RATE: 18 BRPM | SYSTOLIC BLOOD PRESSURE: 104 MMHG | OXYGEN SATURATION: 100 %

## 2017-11-01 DIAGNOSIS — N93.8 OTHER SPECIFIED ABNORMAL UTERINE AND VAGINAL BLEEDING: ICD-10-CM

## 2017-11-01 LAB
HCT VFR BLD CALC: 28.6 % — LOW (ref 34.5–45)
HGB BLD-MCNC: 9.9 G/DL — LOW (ref 11.5–15.5)
MCHC RBC-ENTMCNC: 29.1 PG — SIGNIFICANT CHANGE UP (ref 27–34)
MCHC RBC-ENTMCNC: 34.6 % — SIGNIFICANT CHANGE UP (ref 32–36)
MCV RBC AUTO: 84.1 FL — SIGNIFICANT CHANGE UP (ref 80–100)
NRBC # FLD: 0 — SIGNIFICANT CHANGE UP
PLATELET # BLD AUTO: 222 K/UL — SIGNIFICANT CHANGE UP (ref 150–400)
PMV BLD: 9.9 FL — SIGNIFICANT CHANGE UP (ref 7–13)
RBC # BLD: 3.4 M/UL — LOW (ref 3.8–5.2)
RBC # FLD: 14.1 % — SIGNIFICANT CHANGE UP (ref 10.3–14.5)
WBC # BLD: 8.04 K/UL — SIGNIFICANT CHANGE UP (ref 3.8–10.5)
WBC # FLD AUTO: 8.04 K/UL — SIGNIFICANT CHANGE UP (ref 3.8–10.5)

## 2017-11-01 PROCEDURE — 76815 OB US LIMITED FETUS(S): CPT | Mod: 26

## 2017-11-01 RX ORDER — INDOMETHACIN 50 MG
1 CAPSULE ORAL
Qty: 120 | Refills: 0 | OUTPATIENT
Start: 2017-11-01 | End: 2017-12-01

## 2017-11-01 RX ORDER — INDOMETHACIN 50 MG
1 CAPSULE ORAL
Qty: 3 | Refills: 0 | OUTPATIENT
Start: 2017-11-01

## 2017-11-01 RX ADMIN — Medication 25 MILLIGRAM(S): at 00:00

## 2017-11-01 RX ADMIN — Medication 25 MILLIGRAM(S): at 00:11

## 2017-11-01 RX ADMIN — Medication 25 MILLIGRAM(S): at 06:20

## 2017-11-01 RX ADMIN — Medication 25 MILLIGRAM(S): at 12:08

## 2017-11-01 RX ADMIN — Medication 25 MILLIGRAM(S): at 06:32

## 2017-11-01 NOTE — DISCHARGE NOTE ANTEPARTUM - MEDICATION SUMMARY - MEDICATIONS TO TAKE
I will START or STAY ON the medications listed below when I get home from the hospital:    indomethacin 25 mg oral capsule  -- 1 cap(s) by mouth every 6 hours  -- Indication: For cramping I will START or STAY ON the medications listed below when I get home from the hospital:    indomethacin 25 mg oral capsule  -- 1 cap(s) by mouth every 6 hours MDD:4  -- Indication: For contraction

## 2017-11-01 NOTE — DISCHARGE NOTE ANTEPARTUM - CARE PLAN
Principal Discharge DX:	Polyp at cervical os  Goal:	healthy pregnancy  Instructions for follow-up, activity and diet:	reg diet, nothing in vagina (no intercourse, no tampons)

## 2017-11-01 NOTE — DISCHARGE NOTE ANTEPARTUM - HOSPITAL COURSE
30yo  admitted at 16wks with heavy vaginal bleeding from an aborting cervical polyp. She underwent cerclage placement and polypectomy on 10/31 without any complications. She was discharged home on  in stable condition.

## 2017-11-01 NOTE — DISCHARGE NOTE ANTEPARTUM - CARE PROVIDER_API CALL
Chuy Kebede), Obstetrics and Gynecology  [address] [facility]  [address]nd [place] [address]  Phone: [phone]  Fax: [phone]

## 2017-11-03 LAB — SURGICAL PATHOLOGY STUDY: SIGNIFICANT CHANGE UP

## 2017-11-07 ENCOUNTER — ASOB RESULT (OUTPATIENT)
Age: 27
End: 2017-11-07

## 2017-11-07 ENCOUNTER — APPOINTMENT (OUTPATIENT)
Dept: ANTEPARTUM | Facility: CLINIC | Age: 27
End: 2017-11-07
Payer: MEDICAID

## 2017-11-07 PROCEDURE — 76817 TRANSVAGINAL US OBSTETRIC: CPT

## 2017-11-07 PROCEDURE — 76815 OB US LIMITED FETUS(S): CPT

## 2017-11-14 ENCOUNTER — APPOINTMENT (OUTPATIENT)
Dept: OBGYN | Facility: CLINIC | Age: 27
End: 2017-11-14
Payer: MEDICAID

## 2017-11-14 VITALS
BODY MASS INDEX: 25.8 KG/M2 | HEIGHT: 59 IN | WEIGHT: 128 LBS | SYSTOLIC BLOOD PRESSURE: 102 MMHG | DIASTOLIC BLOOD PRESSURE: 56 MMHG

## 2017-11-14 PROCEDURE — 36415 COLL VENOUS BLD VENIPUNCTURE: CPT

## 2017-11-14 PROCEDURE — 0502F SUBSEQUENT PRENATAL CARE: CPT

## 2017-11-19 LAB
2ND TRIMESTER DATA: NORMAL
AFP PNL SERPL: NORMAL
AFP SERPL-ACNC: NORMAL
CLINICAL BIOCHEMIST REVIEW: NORMAL
NOTES NTD: NORMAL

## 2017-11-21 ENCOUNTER — APPOINTMENT (OUTPATIENT)
Dept: ANTEPARTUM | Facility: CLINIC | Age: 27
End: 2017-11-21
Payer: MEDICAID

## 2017-11-21 ENCOUNTER — ASOB RESULT (OUTPATIENT)
Age: 27
End: 2017-11-21

## 2017-11-21 PROCEDURE — 76811 OB US DETAILED SNGL FETUS: CPT

## 2017-11-21 PROCEDURE — 76817 TRANSVAGINAL US OBSTETRIC: CPT

## 2017-12-05 ENCOUNTER — APPOINTMENT (OUTPATIENT)
Dept: ANTEPARTUM | Facility: CLINIC | Age: 27
End: 2017-12-05
Payer: MEDICAID

## 2017-12-05 ENCOUNTER — ASOB RESULT (OUTPATIENT)
Age: 27
End: 2017-12-05

## 2017-12-05 PROCEDURE — 76817 TRANSVAGINAL US OBSTETRIC: CPT

## 2017-12-11 ENCOUNTER — TRANSCRIPTION ENCOUNTER (OUTPATIENT)
Age: 27
End: 2017-12-11

## 2017-12-12 ENCOUNTER — APPOINTMENT (OUTPATIENT)
Dept: OBGYN | Facility: CLINIC | Age: 27
End: 2017-12-12
Payer: MEDICAID

## 2017-12-12 VITALS
SYSTOLIC BLOOD PRESSURE: 110 MMHG | HEIGHT: 59 IN | BODY MASS INDEX: 31.65 KG/M2 | DIASTOLIC BLOOD PRESSURE: 64 MMHG | WEIGHT: 157 LBS

## 2017-12-12 VITALS
WEIGHT: 134 LBS | SYSTOLIC BLOOD PRESSURE: 110 MMHG | HEIGHT: 59 IN | BODY MASS INDEX: 27.01 KG/M2 | DIASTOLIC BLOOD PRESSURE: 60 MMHG

## 2017-12-12 LAB
BILIRUB UR QL STRIP: NORMAL
GLUCOSE UR-MCNC: NORMAL
HCG UR QL: 0.2 EU/DL
HGB UR QL STRIP.AUTO: NORMAL
KETONES UR-MCNC: NORMAL
LEUKOCYTE ESTERASE UR QL STRIP: NORMAL
NITRITE UR QL STRIP: NORMAL
PH UR STRIP: 6
PROT UR STRIP-MCNC: NORMAL
SP GR UR STRIP: 1

## 2017-12-12 PROCEDURE — 0502F SUBSEQUENT PRENATAL CARE: CPT

## 2017-12-14 LAB — BACTERIA UR CULT: ABNORMAL

## 2018-01-09 ENCOUNTER — APPOINTMENT (OUTPATIENT)
Dept: OBGYN | Facility: CLINIC | Age: 28
End: 2018-01-09
Payer: MEDICAID

## 2018-01-09 ENCOUNTER — LABORATORY RESULT (OUTPATIENT)
Age: 28
End: 2018-01-09

## 2018-01-09 VITALS
DIASTOLIC BLOOD PRESSURE: 62 MMHG | WEIGHT: 136 LBS | BODY MASS INDEX: 27.42 KG/M2 | SYSTOLIC BLOOD PRESSURE: 110 MMHG | HEIGHT: 59 IN

## 2018-01-09 PROCEDURE — 0502F SUBSEQUENT PRENATAL CARE: CPT

## 2018-01-09 PROCEDURE — 36415 COLL VENOUS BLD VENIPUNCTURE: CPT

## 2018-01-10 LAB
ABO + RH PNL BLD: NORMAL
BASOPHILS # BLD AUTO: 0.01 K/UL
BASOPHILS NFR BLD AUTO: 0.1 %
BLD GP AB SCN SERPL QL: ABNORMAL
EOSINOPHIL # BLD AUTO: 0.12 K/UL
EOSINOPHIL NFR BLD AUTO: 1.1 %
GLUCOSE 1H P 50 G GLC PO SERPL-MCNC: 97 MG/DL
HCT VFR BLD CALC: 34 %
HGB BLD-MCNC: 10.7 G/DL
IMM GRANULOCYTES NFR BLD AUTO: 0.5 %
LYMPHOCYTES # BLD AUTO: 1.6 K/UL
LYMPHOCYTES NFR BLD AUTO: 15.2 %
MAN DIFF?: NORMAL
MCHC RBC-ENTMCNC: 27.8 PG
MCHC RBC-ENTMCNC: 31.5 GM/DL
MCV RBC AUTO: 88.3 FL
MONOCYTES # BLD AUTO: 0.71 K/UL
MONOCYTES NFR BLD AUTO: 6.7 %
NEUTROPHILS # BLD AUTO: 8.03 K/UL
NEUTROPHILS NFR BLD AUTO: 76.4 %
PLATELET # BLD AUTO: 315 K/UL
RBC # BLD: 3.85 M/UL
RBC # FLD: 14.7 %
WBC # FLD AUTO: 10.52 K/UL

## 2018-01-23 ENCOUNTER — APPOINTMENT (OUTPATIENT)
Dept: OBGYN | Facility: CLINIC | Age: 28
End: 2018-01-23
Payer: MEDICAID

## 2018-01-23 VITALS
HEIGHT: 59 IN | WEIGHT: 142 LBS | BODY MASS INDEX: 28.63 KG/M2 | SYSTOLIC BLOOD PRESSURE: 130 MMHG | DIASTOLIC BLOOD PRESSURE: 82 MMHG

## 2018-01-23 DIAGNOSIS — Z34.92 ENCOUNTER FOR SUPERVISION OF NORMAL PREGNANCY, UNSPECIFIED, SECOND TRIMESTER: ICD-10-CM

## 2018-01-23 PROCEDURE — 90471 IMMUNIZATION ADMIN: CPT

## 2018-01-23 PROCEDURE — 0502F SUBSEQUENT PRENATAL CARE: CPT

## 2018-01-23 PROCEDURE — 90715 TDAP VACCINE 7 YRS/> IM: CPT

## 2018-01-23 PROCEDURE — 96372 THER/PROPH/DIAG INJ SC/IM: CPT

## 2018-01-23 RX ORDER — HUMAN RHO(D) IMMUNE GLOBULIN 300 UG/1
1500 INJECTION, SOLUTION INTRAMUSCULAR
Refills: 0 | Status: COMPLETED | OUTPATIENT
Start: 2018-01-23

## 2018-01-23 RX ADMIN — Medication 0 UNIT: at 00:00

## 2018-01-30 ENCOUNTER — ASOB RESULT (OUTPATIENT)
Age: 28
End: 2018-01-30

## 2018-01-30 ENCOUNTER — APPOINTMENT (OUTPATIENT)
Dept: ANTEPARTUM | Facility: CLINIC | Age: 28
End: 2018-01-30
Payer: MEDICAID

## 2018-01-30 PROCEDURE — 76817 TRANSVAGINAL US OBSTETRIC: CPT

## 2018-01-30 PROCEDURE — 76816 OB US FOLLOW-UP PER FETUS: CPT

## 2018-01-30 PROCEDURE — 76819 FETAL BIOPHYS PROFIL W/O NST: CPT

## 2018-02-06 ENCOUNTER — APPOINTMENT (OUTPATIENT)
Dept: OBGYN | Facility: CLINIC | Age: 28
End: 2018-02-06
Payer: MEDICAID

## 2018-02-06 VITALS
WEIGHT: 147 LBS | SYSTOLIC BLOOD PRESSURE: 136 MMHG | BODY MASS INDEX: 29.64 KG/M2 | DIASTOLIC BLOOD PRESSURE: 72 MMHG | HEIGHT: 59 IN

## 2018-02-06 PROCEDURE — 0502F SUBSEQUENT PRENATAL CARE: CPT

## 2018-02-10 LAB — BACTERIA UR CULT: NORMAL

## 2018-02-16 ENCOUNTER — OUTPATIENT (OUTPATIENT)
Dept: OUTPATIENT SERVICES | Facility: HOSPITAL | Age: 28
LOS: 1 days | End: 2018-02-16

## 2018-02-16 DIAGNOSIS — Z3A.00 WEEKS OF GESTATION OF PREGNANCY NOT SPECIFIED: ICD-10-CM

## 2018-02-16 DIAGNOSIS — O26.899 OTHER SPECIFIED PREGNANCY RELATED CONDITIONS, UNSPECIFIED TRIMESTER: ICD-10-CM

## 2018-02-16 LAB
ALBUMIN SERPL ELPH-MCNC: 3.7 G/DL — SIGNIFICANT CHANGE UP (ref 3.3–5)
ALP SERPL-CCNC: 100 U/L — SIGNIFICANT CHANGE UP (ref 40–120)
ALT FLD-CCNC: 25 U/L — SIGNIFICANT CHANGE UP (ref 4–33)
AMYLASE P1 CFR SERPL: 114 U/L — SIGNIFICANT CHANGE UP (ref 25–125)
AST SERPL-CCNC: 25 U/L — SIGNIFICANT CHANGE UP (ref 4–32)
BILIRUB SERPL-MCNC: 0.5 MG/DL — SIGNIFICANT CHANGE UP (ref 0.2–1.2)
BUN SERPL-MCNC: 12 MG/DL — SIGNIFICANT CHANGE UP (ref 7–23)
CALCIUM SERPL-MCNC: 8.6 MG/DL — SIGNIFICANT CHANGE UP (ref 8.4–10.5)
CHLORIDE SERPL-SCNC: 100 MMOL/L — SIGNIFICANT CHANGE UP (ref 98–107)
CO2 SERPL-SCNC: 21 MMOL/L — LOW (ref 22–31)
CREAT SERPL-MCNC: 0.53 MG/DL — SIGNIFICANT CHANGE UP (ref 0.5–1.3)
GLUCOSE SERPL-MCNC: 111 MG/DL — HIGH (ref 70–99)
HCT VFR BLD CALC: 38.4 % — SIGNIFICANT CHANGE UP (ref 34.5–45)
HGB BLD-MCNC: 12.8 G/DL — SIGNIFICANT CHANGE UP (ref 11.5–15.5)
LIDOCAIN IGE QN: 39.9 U/L — SIGNIFICANT CHANGE UP (ref 7–60)
MCHC RBC-ENTMCNC: 27.5 PG — SIGNIFICANT CHANGE UP (ref 27–34)
MCHC RBC-ENTMCNC: 33.3 % — SIGNIFICANT CHANGE UP (ref 32–36)
MCV RBC AUTO: 82.4 FL — SIGNIFICANT CHANGE UP (ref 80–100)
NRBC # FLD: 0 — SIGNIFICANT CHANGE UP
PLATELET # BLD AUTO: 272 K/UL — SIGNIFICANT CHANGE UP (ref 150–400)
PMV BLD: 10.6 FL — SIGNIFICANT CHANGE UP (ref 7–13)
POTASSIUM SERPL-MCNC: 3.7 MMOL/L — SIGNIFICANT CHANGE UP (ref 3.5–5.3)
POTASSIUM SERPL-SCNC: 3.7 MMOL/L — SIGNIFICANT CHANGE UP (ref 3.5–5.3)
PROT SERPL-MCNC: 6.7 G/DL — SIGNIFICANT CHANGE UP (ref 6–8.3)
RBC # BLD: 4.66 M/UL — SIGNIFICANT CHANGE UP (ref 3.8–5.2)
RBC # FLD: 15.9 % — HIGH (ref 10.3–14.5)
SODIUM SERPL-SCNC: 140 MMOL/L — SIGNIFICANT CHANGE UP (ref 135–145)
WBC # BLD: 10.56 K/UL — HIGH (ref 3.8–10.5)
WBC # FLD AUTO: 10.56 K/UL — HIGH (ref 3.8–10.5)

## 2018-02-16 RX ORDER — ONDANSETRON 8 MG/1
8 TABLET, FILM COATED ORAL ONCE
Qty: 0 | Refills: 0 | Status: DISCONTINUED | OUTPATIENT
Start: 2018-02-16 | End: 2018-03-03

## 2018-02-16 RX ORDER — SODIUM CHLORIDE 9 MG/ML
1000 INJECTION, SOLUTION INTRAVENOUS ONCE
Qty: 0 | Refills: 0 | Status: DISCONTINUED | OUTPATIENT
Start: 2018-02-16 | End: 2018-03-03

## 2018-02-23 ENCOUNTER — APPOINTMENT (OUTPATIENT)
Dept: OBGYN | Facility: CLINIC | Age: 28
End: 2018-02-23
Payer: MEDICAID

## 2018-02-23 VITALS
SYSTOLIC BLOOD PRESSURE: 120 MMHG | WEIGHT: 144 LBS | DIASTOLIC BLOOD PRESSURE: 80 MMHG | HEIGHT: 59 IN | BODY MASS INDEX: 29.03 KG/M2

## 2018-02-23 DIAGNOSIS — B37.9 CANDIDIASIS, UNSPECIFIED: ICD-10-CM

## 2018-02-23 DIAGNOSIS — O46.91: ICD-10-CM

## 2018-02-23 PROCEDURE — 0502F SUBSEQUENT PRENATAL CARE: CPT

## 2018-02-23 PROCEDURE — 76816 OB US FOLLOW-UP PER FETUS: CPT

## 2018-02-23 RX ORDER — NITROFURANTOIN MACROCRYSTALS 100 MG/1
100 CAPSULE ORAL
Qty: 90 | Refills: 1 | Status: DISCONTINUED | COMMUNITY
Start: 2017-09-15 | End: 2018-02-23

## 2018-02-23 RX ORDER — TERCONAZOLE 8 MG/G
0.8 CREAM VAGINAL
Qty: 3 | Refills: 0 | Status: DISCONTINUED | COMMUNITY
Start: 2018-01-09 | End: 2018-02-23

## 2018-02-26 LAB
ALBUMIN SERPL ELPH-MCNC: 3.6 G/DL
ALP BLD-CCNC: 117 U/L
ALT SERPL-CCNC: 260 U/L
ANION GAP SERPL CALC-SCNC: 15 MMOL/L
AST SERPL-CCNC: 139 U/L
BASOPHILS # BLD AUTO: 0.01 K/UL
BASOPHILS NFR BLD AUTO: 0.1 %
BILE AC SER-MCNC: 8.9 UMOL/L
BILIRUB SERPL-MCNC: 0.3 MG/DL
BUN SERPL-MCNC: 6 MG/DL
CALCIUM SERPL-MCNC: 9.7 MG/DL
CHLORIDE SERPL-SCNC: 102 MMOL/L
CO2 SERPL-SCNC: 22 MMOL/L
CREAT SERPL-MCNC: 0.54 MG/DL
EOSINOPHIL # BLD AUTO: 0.08 K/UL
EOSINOPHIL NFR BLD AUTO: 0.8 %
GLUCOSE SERPL-MCNC: 100 MG/DL
HCT VFR BLD CALC: 37.9 %
HGB BLD-MCNC: 11.7 G/DL
HIV1+2 AB SPEC QL IA.RAPID: NONREACTIVE
IMM GRANULOCYTES NFR BLD AUTO: 0.6 %
LYMPHOCYTES # BLD AUTO: 1.64 K/UL
LYMPHOCYTES NFR BLD AUTO: 17 %
MAN DIFF?: NORMAL
MCHC RBC-ENTMCNC: 26.6 PG
MCHC RBC-ENTMCNC: 30.9 GM/DL
MCV RBC AUTO: 86.1 FL
MONOCYTES # BLD AUTO: 0.71 K/UL
MONOCYTES NFR BLD AUTO: 7.3 %
NEUTROPHILS # BLD AUTO: 7.16 K/UL
NEUTROPHILS NFR BLD AUTO: 74.2 %
PLATELET # BLD AUTO: 280 K/UL
POTASSIUM SERPL-SCNC: 3.9 MMOL/L
PROT SERPL-MCNC: 6.1 G/DL
RBC # BLD: 4.4 M/UL
RBC # FLD: 15.8 %
SODIUM SERPL-SCNC: 139 MMOL/L
WBC # FLD AUTO: 9.66 K/UL

## 2018-02-27 ENCOUNTER — LABORATORY RESULT (OUTPATIENT)
Age: 28
End: 2018-02-27

## 2018-02-27 ENCOUNTER — APPOINTMENT (OUTPATIENT)
Dept: OBGYN | Facility: CLINIC | Age: 28
End: 2018-02-27
Payer: MEDICAID

## 2018-02-27 VITALS
SYSTOLIC BLOOD PRESSURE: 120 MMHG | HEIGHT: 59 IN | DIASTOLIC BLOOD PRESSURE: 70 MMHG | BODY MASS INDEX: 28.63 KG/M2 | WEIGHT: 142 LBS

## 2018-02-27 LAB
BILIRUB UR QL STRIP: NORMAL
GLUCOSE UR-MCNC: NORMAL
HCG UR QL: 0.2 EU/DL
HGB UR QL STRIP.AUTO: NORMAL
KETONES UR-MCNC: NORMAL
LEUKOCYTE ESTERASE UR QL STRIP: NORMAL
NITRITE UR QL STRIP: NORMAL
PH UR STRIP: 7
PROT UR STRIP-MCNC: NORMAL
SP GR UR STRIP: <1.005

## 2018-02-27 PROCEDURE — 0502F SUBSEQUENT PRENATAL CARE: CPT

## 2018-02-28 LAB
ALBUMIN SERPL ELPH-MCNC: 3.7 G/DL
ALP BLD-CCNC: 118 U/L
ALT SERPL-CCNC: 167 U/L
ANION GAP SERPL CALC-SCNC: 15 MMOL/L
APPEARANCE: CLEAR
APTT BLD: 30.4 SEC
AST SERPL-CCNC: 76 U/L
BACTERIA: NEGATIVE
BASOPHILS # BLD AUTO: 0 K/UL
BASOPHILS NFR BLD AUTO: 0 %
BILIRUB SERPL-MCNC: 0.3 MG/DL
BILIRUBIN URINE: NEGATIVE
BLOOD URINE: NEGATIVE
BUN SERPL-MCNC: 5 MG/DL
CALCIUM SERPL-MCNC: 9.6 MG/DL
CHLORIDE SERPL-SCNC: 100 MMOL/L
CO2 SERPL-SCNC: 23 MMOL/L
COLOR: YELLOW
CREAT SERPL-MCNC: 0.48 MG/DL
EOSINOPHIL # BLD AUTO: 0.16 K/UL
EOSINOPHIL NFR BLD AUTO: 1.7
FIBRINOGEN PPP COAG.DERIVED-MCNC: 852 MG/DL
GLUCOSE QUALITATIVE U: NEGATIVE MG/DL
GLUCOSE SERPL-MCNC: 58 MG/DL
HCT VFR BLD CALC: 38.9 %
HGB BLD-MCNC: 12.3 G/DL
HYALINE CASTS: 4 /LPF
INR PPP: 0.92 RATIO
KETONES URINE: NEGATIVE
LDH SERPL-CCNC: 286 U/L
LEUKOCYTE ESTERASE URINE: NEGATIVE
LYMPHOCYTES # BLD AUTO: 1.56 K/UL
LYMPHOCYTES NFR BLD AUTO: 16.2 %
MAN DIFF?: NORMAL
MCHC RBC-ENTMCNC: 27.7 PG
MCHC RBC-ENTMCNC: 31.6 GM/DL
MCV RBC AUTO: 87.6 FL
MICROSCOPIC-UA: NORMAL
MONOCYTES # BLD AUTO: 0.33 K/UL
MONOCYTES NFR BLD AUTO: 3.4 %
NEUTROPHILS # BLD AUTO: 6.89 K/UL
NEUTROPHILS NFR BLD AUTO: 71.8 %
NITRITE URINE: NEGATIVE
PH URINE: 7
PLATELET # BLD AUTO: 305 K/UL
POTASSIUM SERPL-SCNC: 4.1 MMOL/L
PROT SERPL-MCNC: 7.1 G/DL
PROTEIN URINE: NEGATIVE MG/DL
PT BLD: 10.4 SEC
RBC # BLD: 4.44 M/UL
RBC # FLD: 16.3 %
RED BLOOD CELLS URINE: 1 /HPF
SODIUM SERPL-SCNC: 138 MMOL/L
SPECIFIC GRAVITY URINE: 1
SQUAMOUS EPITHELIAL CELLS: 4 /HPF
URATE SERPL-MCNC: 4.2 MG/DL
UROBILINOGEN URINE: NEGATIVE MG/DL
WBC # FLD AUTO: 9.6 K/UL
WHITE BLOOD CELLS URINE: 0 /HPF

## 2018-03-05 LAB — BILE AC SER-MCNC: 11.4 UMOL/L

## 2018-03-10 ENCOUNTER — APPOINTMENT (OUTPATIENT)
Dept: OBGYN | Facility: CLINIC | Age: 28
End: 2018-03-10
Payer: MEDICAID

## 2018-03-10 VITALS
HEIGHT: 59 IN | DIASTOLIC BLOOD PRESSURE: 80 MMHG | WEIGHT: 147 LBS | SYSTOLIC BLOOD PRESSURE: 120 MMHG | BODY MASS INDEX: 29.64 KG/M2

## 2018-03-10 LAB
BILIRUB UR QL STRIP: NORMAL
GLUCOSE UR-MCNC: NORMAL
HCG UR QL: 0.2 EU/DL
HGB UR QL STRIP.AUTO: NORMAL
KETONES UR-MCNC: NORMAL
LEUKOCYTE ESTERASE UR QL STRIP: NORMAL
NITRITE UR QL STRIP: NORMAL
PH UR STRIP: 7
PROT UR STRIP-MCNC: NORMAL
SP GR UR STRIP: 1.02

## 2018-03-10 PROCEDURE — 0502F SUBSEQUENT PRENATAL CARE: CPT

## 2018-03-11 LAB
ALBUMIN SERPL ELPH-MCNC: 3.6 G/DL
ALP BLD-CCNC: 120 U/L
ALT SERPL-CCNC: 64 U/L
ANION GAP SERPL CALC-SCNC: 15 MMOL/L
APPEARANCE: CLEAR
APTT BLD: 31 SEC
AST SERPL-CCNC: 42 U/L
BACTERIA: NEGATIVE
BASOPHILS # BLD AUTO: 0.01 K/UL
BASOPHILS NFR BLD AUTO: 0.1 %
BILIRUB SERPL-MCNC: 0.2 MG/DL
BILIRUBIN URINE: NEGATIVE
BLOOD URINE: NEGATIVE
BUN SERPL-MCNC: 7 MG/DL
CALCIUM SERPL-MCNC: 9.7 MG/DL
CHLORIDE SERPL-SCNC: 102 MMOL/L
CO2 SERPL-SCNC: 19 MMOL/L
COLOR: YELLOW
CREAT SERPL-MCNC: 0.51 MG/DL
EOSINOPHIL # BLD AUTO: 0.08 K/UL
EOSINOPHIL NFR BLD AUTO: 0.8 %
FIBRINOGEN PPP COAG.DERIVED-MCNC: 854 MG/DL
GLUCOSE QUALITATIVE U: NEGATIVE MG/DL
GLUCOSE SERPL-MCNC: 100 MG/DL
HCT VFR BLD CALC: 38.3 %
HGB BLD-MCNC: 12.1 G/DL
HIV1+2 AB SPEC QL IA.RAPID: NONREACTIVE
HYALINE CASTS: 4 /LPF
IMM GRANULOCYTES NFR BLD AUTO: 0.6 %
INR PPP: 0.96 RATIO
KETONES URINE: NEGATIVE
LDH SERPL-CCNC: 316 U/L
LEUKOCYTE ESTERASE URINE: NEGATIVE
LYMPHOCYTES # BLD AUTO: 1.5 K/UL
LYMPHOCYTES NFR BLD AUTO: 15.7 %
MAN DIFF?: NORMAL
MCHC RBC-ENTMCNC: 26.7 PG
MCHC RBC-ENTMCNC: 31.6 GM/DL
MCV RBC AUTO: 84.5 FL
MICROSCOPIC-UA: NORMAL
MONOCYTES # BLD AUTO: 0.72 K/UL
MONOCYTES NFR BLD AUTO: 7.5 %
NEUTROPHILS # BLD AUTO: 7.2 K/UL
NEUTROPHILS NFR BLD AUTO: 75.3 %
NITRITE URINE: NEGATIVE
PH URINE: 7
PLATELET # BLD AUTO: 274 K/UL
POTASSIUM SERPL-SCNC: 3.7 MMOL/L
PROT SERPL-MCNC: 6.7 G/DL
PROTEIN URINE: NEGATIVE MG/DL
PT BLD: 10.8 SEC
RBC # BLD: 4.53 M/UL
RBC # FLD: 16.6 %
RED BLOOD CELLS URINE: 3 /HPF
SODIUM SERPL-SCNC: 136 MMOL/L
SPECIFIC GRAVITY URINE: 1.01
SQUAMOUS EPITHELIAL CELLS: >27 /HPF
URATE SERPL-MCNC: 4.3 MG/DL
UROBILINOGEN URINE: NEGATIVE MG/DL
WBC # FLD AUTO: 9.57 K/UL
WHITE BLOOD CELLS URINE: 2 /HPF

## 2018-03-15 LAB — BILE AC SER-MCNC: 8.9 UMOL/L

## 2018-03-23 ENCOUNTER — APPOINTMENT (OUTPATIENT)
Dept: OBGYN | Facility: CLINIC | Age: 28
End: 2018-03-23

## 2018-03-23 VITALS
WEIGHT: 150 LBS | HEIGHT: 59 IN | SYSTOLIC BLOOD PRESSURE: 120 MMHG | BODY MASS INDEX: 30.24 KG/M2 | DIASTOLIC BLOOD PRESSURE: 80 MMHG

## 2018-03-23 DIAGNOSIS — R74.0 NONSPECIFIC ELEVATION OF LEVELS OF TRANSAMINASE AND LACTIC ACID DEHYDROGENASE [LDH]: ICD-10-CM

## 2018-03-23 DIAGNOSIS — Z92.29 PERSONAL HISTORY OF OTHER DRUG THERAPY: ICD-10-CM

## 2018-03-23 DIAGNOSIS — Z23 ENCOUNTER FOR IMMUNIZATION: ICD-10-CM

## 2018-03-23 DIAGNOSIS — Z36.4 ENCOUNTER FOR ANTENATAL SCREENING FOR FETAL GROWTH RETARDATION: ICD-10-CM

## 2018-03-23 DIAGNOSIS — A60.00 HERPESVIRAL INFECTION OF UROGENITAL SYSTEM, UNSPECIFIED: ICD-10-CM

## 2018-03-23 DIAGNOSIS — Z87.09 PERSONAL HISTORY OF OTHER DISEASES OF THE RESPIRATORY SYSTEM: ICD-10-CM

## 2018-03-24 PROBLEM — Z87.09 HISTORY OF NASAL CONGESTION: Status: RESOLVED | Noted: 2017-12-29 | Resolved: 2018-03-24

## 2018-03-24 PROBLEM — Z36.4 ULTRASOUND FOR ANTENATAL SCREENING FOR FETAL GROWTH RETARDATION: Status: RESOLVED | Noted: 2018-02-23 | Resolved: 2018-03-24

## 2018-03-24 PROBLEM — Z23 NEED FOR TDAP VACCINATION: Status: RESOLVED | Noted: 2018-01-23 | Resolved: 2018-03-24

## 2018-03-24 PROBLEM — R74.0 TRANSAMINITIS: Status: RESOLVED | Noted: 2018-02-27 | Resolved: 2018-03-24

## 2018-03-24 PROBLEM — Z92.29 HISTORY OF INFLUENZA VACCINATION: Status: RESOLVED | Noted: 2017-09-19 | Resolved: 2018-03-24

## 2018-04-03 ENCOUNTER — APPOINTMENT (OUTPATIENT)
Dept: OBGYN | Facility: CLINIC | Age: 28
End: 2018-04-03
Payer: MEDICAID

## 2018-04-03 VITALS
SYSTOLIC BLOOD PRESSURE: 110 MMHG | WEIGHT: 152 LBS | DIASTOLIC BLOOD PRESSURE: 80 MMHG | BODY MASS INDEX: 30.64 KG/M2 | HEIGHT: 59 IN

## 2018-04-03 PROCEDURE — 0502F SUBSEQUENT PRENATAL CARE: CPT

## 2018-04-10 ENCOUNTER — APPOINTMENT (OUTPATIENT)
Dept: OBGYN | Facility: CLINIC | Age: 28
End: 2018-04-10
Payer: MEDICAID

## 2018-04-10 VITALS
HEIGHT: 59 IN | BODY MASS INDEX: 30.64 KG/M2 | DIASTOLIC BLOOD PRESSURE: 80 MMHG | WEIGHT: 152 LBS | SYSTOLIC BLOOD PRESSURE: 122 MMHG

## 2018-04-10 PROCEDURE — 0501F PRENATAL FLOW SHEET: CPT

## 2018-04-17 ENCOUNTER — APPOINTMENT (OUTPATIENT)
Dept: OBGYN | Facility: CLINIC | Age: 28
End: 2018-04-17
Payer: MEDICAID

## 2018-04-17 VITALS
DIASTOLIC BLOOD PRESSURE: 70 MMHG | HEIGHT: 59 IN | BODY MASS INDEX: 30.84 KG/M2 | WEIGHT: 153 LBS | SYSTOLIC BLOOD PRESSURE: 116 MMHG

## 2018-04-17 PROCEDURE — 0502F SUBSEQUENT PRENATAL CARE: CPT

## 2018-04-17 PROCEDURE — 76816 OB US FOLLOW-UP PER FETUS: CPT

## 2018-04-17 PROCEDURE — 76818 FETAL BIOPHYS PROFILE W/NST: CPT

## 2018-04-22 ENCOUNTER — RX RENEWAL (OUTPATIENT)
Age: 28
End: 2018-04-22

## 2018-04-23 ENCOUNTER — OUTPATIENT (OUTPATIENT)
Dept: OUTPATIENT SERVICES | Facility: HOSPITAL | Age: 28
LOS: 1 days | End: 2018-04-23
Payer: MEDICAID

## 2018-04-23 DIAGNOSIS — O26.899 OTHER SPECIFIED PREGNANCY RELATED CONDITIONS, UNSPECIFIED TRIMESTER: ICD-10-CM

## 2018-04-23 DIAGNOSIS — Z3A.00 WEEKS OF GESTATION OF PREGNANCY NOT SPECIFIED: ICD-10-CM

## 2018-04-23 PROCEDURE — 76818 FETAL BIOPHYS PROFILE W/NST: CPT | Mod: 26

## 2018-04-23 PROCEDURE — 99215 OFFICE O/P EST HI 40 MIN: CPT | Mod: 25

## 2018-04-24 ENCOUNTER — APPOINTMENT (OUTPATIENT)
Dept: OBGYN | Facility: CLINIC | Age: 28
End: 2018-04-24

## 2018-04-24 ENCOUNTER — TRANSCRIPTION ENCOUNTER (OUTPATIENT)
Age: 28
End: 2018-04-24

## 2018-04-24 ENCOUNTER — INPATIENT (INPATIENT)
Facility: HOSPITAL | Age: 28
LOS: 3 days | Discharge: HOME | End: 2018-04-28
Attending: OBSTETRICS & GYNECOLOGY | Admitting: OBSTETRICS & GYNECOLOGY
Payer: MEDICAID

## 2018-04-24 VITALS — HEIGHT: 59 IN | WEIGHT: 158.73 LBS

## 2018-04-24 DIAGNOSIS — O48.0 POST-TERM PREGNANCY: ICD-10-CM

## 2018-04-24 LAB
BASOPHILS # BLD AUTO: 0.01 K/UL — SIGNIFICANT CHANGE UP (ref 0–0.2)
BASOPHILS NFR BLD AUTO: 0.1 % — SIGNIFICANT CHANGE UP (ref 0–2)
BLD GP AB SCN SERPL QL: NEGATIVE — SIGNIFICANT CHANGE UP
EOSINOPHIL # BLD AUTO: 0.05 K/UL — SIGNIFICANT CHANGE UP (ref 0–0.5)
EOSINOPHIL NFR BLD AUTO: 0.6 % — SIGNIFICANT CHANGE UP (ref 0–6)
HCT VFR BLD CALC: 37.1 % — SIGNIFICANT CHANGE UP (ref 34.5–45)
HGB BLD-MCNC: 12.6 G/DL — SIGNIFICANT CHANGE UP (ref 11.5–15.5)
IMM GRANULOCYTES # BLD AUTO: 0.03 # — SIGNIFICANT CHANGE UP
IMM GRANULOCYTES NFR BLD AUTO: 0.4 % — SIGNIFICANT CHANGE UP (ref 0–1.5)
LYMPHOCYTES # BLD AUTO: 1.36 K/UL — SIGNIFICANT CHANGE UP (ref 1–3.3)
LYMPHOCYTES # BLD AUTO: 16.6 % — SIGNIFICANT CHANGE UP (ref 13–44)
MCHC RBC-ENTMCNC: 28.1 PG — SIGNIFICANT CHANGE UP (ref 27–34)
MCHC RBC-ENTMCNC: 34 % — SIGNIFICANT CHANGE UP (ref 32–36)
MCV RBC AUTO: 82.6 FL — SIGNIFICANT CHANGE UP (ref 80–100)
MONOCYTES # BLD AUTO: 0.69 K/UL — SIGNIFICANT CHANGE UP (ref 0–0.9)
MONOCYTES NFR BLD AUTO: 8.4 % — SIGNIFICANT CHANGE UP (ref 2–14)
NEUTROPHILS # BLD AUTO: 6.06 K/UL — SIGNIFICANT CHANGE UP (ref 1.8–7.4)
NEUTROPHILS NFR BLD AUTO: 73.9 % — SIGNIFICANT CHANGE UP (ref 43–77)
NRBC # FLD: 0 — SIGNIFICANT CHANGE UP
PLATELET # BLD AUTO: 222 K/UL — SIGNIFICANT CHANGE UP (ref 150–400)
PMV BLD: 11.1 FL — SIGNIFICANT CHANGE UP (ref 7–13)
RBC # BLD: 4.49 M/UL — SIGNIFICANT CHANGE UP (ref 3.8–5.2)
RBC # FLD: 16.6 % — HIGH (ref 10.3–14.5)
RH IG SCN BLD-IMP: NEGATIVE — SIGNIFICANT CHANGE UP
WBC # BLD: 8.2 K/UL — SIGNIFICANT CHANGE UP (ref 3.8–10.5)
WBC # FLD AUTO: 8.2 K/UL — SIGNIFICANT CHANGE UP (ref 3.8–10.5)

## 2018-04-24 RX ORDER — SODIUM CHLORIDE 9 MG/ML
1000 INJECTION, SOLUTION INTRAVENOUS ONCE
Qty: 0 | Refills: 0 | Status: COMPLETED | OUTPATIENT
Start: 2018-04-24 | End: 2018-04-24

## 2018-04-24 RX ORDER — SODIUM CHLORIDE 9 MG/ML
1000 INJECTION, SOLUTION INTRAVENOUS ONCE
Qty: 0 | Refills: 0 | Status: DISCONTINUED | OUTPATIENT
Start: 2018-04-24 | End: 2018-04-24

## 2018-04-24 RX ORDER — CITRIC ACID/SODIUM CITRATE 300-500 MG
15 SOLUTION, ORAL ORAL EVERY 4 HOURS
Qty: 0 | Refills: 0 | Status: DISCONTINUED | OUTPATIENT
Start: 2018-04-24 | End: 2018-04-25

## 2018-04-24 RX ORDER — AMPICILLIN TRIHYDRATE 250 MG
1 CAPSULE ORAL EVERY 6 HOURS
Qty: 0 | Refills: 0 | Status: DISCONTINUED | OUTPATIENT
Start: 2018-04-25 | End: 2018-04-25

## 2018-04-24 RX ORDER — SODIUM CHLORIDE 9 MG/ML
1000 INJECTION, SOLUTION INTRAVENOUS
Qty: 0 | Refills: 0 | Status: DISCONTINUED | OUTPATIENT
Start: 2018-04-24 | End: 2018-04-25

## 2018-04-24 RX ORDER — AMPICILLIN TRIHYDRATE 250 MG
2 CAPSULE ORAL ONCE
Qty: 0 | Refills: 0 | Status: COMPLETED | OUTPATIENT
Start: 2018-04-24 | End: 2018-04-24

## 2018-04-24 RX ORDER — OXYTOCIN 10 UNIT/ML
333.33 VIAL (ML) INJECTION
Qty: 20 | Refills: 0 | Status: DISCONTINUED | OUTPATIENT
Start: 2018-04-24 | End: 2018-04-24

## 2018-04-24 RX ORDER — CITRIC ACID/SODIUM CITRATE 300-500 MG
15 SOLUTION, ORAL ORAL EVERY 4 HOURS
Qty: 0 | Refills: 0 | Status: DISCONTINUED | OUTPATIENT
Start: 2018-04-24 | End: 2018-04-24

## 2018-04-24 RX ORDER — SODIUM CHLORIDE 9 MG/ML
1000 INJECTION, SOLUTION INTRAVENOUS
Qty: 0 | Refills: 0 | Status: DISCONTINUED | OUTPATIENT
Start: 2018-04-24 | End: 2018-04-24

## 2018-04-24 RX ORDER — OXYTOCIN 10 UNIT/ML
333.33 VIAL (ML) INJECTION
Qty: 20 | Refills: 0 | Status: DISCONTINUED | OUTPATIENT
Start: 2018-04-24 | End: 2018-04-25

## 2018-04-24 RX ORDER — AMPICILLIN TRIHYDRATE 250 MG
CAPSULE ORAL
Qty: 0 | Refills: 0 | Status: DISCONTINUED | OUTPATIENT
Start: 2018-04-24 | End: 2018-04-25

## 2018-04-24 RX ADMIN — SODIUM CHLORIDE 125 MILLILITER(S): 9 INJECTION, SOLUTION INTRAVENOUS at 17:54

## 2018-04-24 RX ADMIN — SODIUM CHLORIDE 2000 MILLILITER(S): 9 INJECTION, SOLUTION INTRAVENOUS at 22:00

## 2018-04-24 RX ADMIN — Medication 216 GRAM(S): at 17:45

## 2018-04-24 RX ADMIN — Medication 208 GRAM(S): at 22:48

## 2018-04-25 ENCOUNTER — TRANSCRIPTION ENCOUNTER (OUTPATIENT)
Age: 28
End: 2018-04-25

## 2018-04-25 LAB — T PALLIDUM AB TITR SER: NEGATIVE — SIGNIFICANT CHANGE UP

## 2018-04-25 PROCEDURE — 59510 CESAREAN DELIVERY: CPT | Mod: U9

## 2018-04-25 RX ORDER — DOCUSATE SODIUM 100 MG
100 CAPSULE ORAL
Qty: 0 | Refills: 0 | Status: DISCONTINUED | OUTPATIENT
Start: 2018-04-25 | End: 2018-04-26

## 2018-04-25 RX ORDER — IBUPROFEN 200 MG
600 TABLET ORAL EVERY 6 HOURS
Qty: 0 | Refills: 0 | Status: COMPLETED | OUTPATIENT
Start: 2018-04-25 | End: 2019-03-24

## 2018-04-25 RX ORDER — TETANUS TOXOID, REDUCED DIPHTHERIA TOXOID AND ACELLULAR PERTUSSIS VACCINE, ADSORBED 5; 2.5; 8; 8; 2.5 [IU]/.5ML; [IU]/.5ML; UG/.5ML; UG/.5ML; UG/.5ML
0.5 SUSPENSION INTRAMUSCULAR ONCE
Qty: 0 | Refills: 0 | Status: DISCONTINUED | OUTPATIENT
Start: 2018-04-25 | End: 2018-04-28

## 2018-04-25 RX ORDER — OXYCODONE HYDROCHLORIDE 5 MG/1
10 TABLET ORAL
Qty: 0 | Refills: 0 | Status: DISCONTINUED | OUTPATIENT
Start: 2018-04-25 | End: 2018-04-26

## 2018-04-25 RX ORDER — NALOXONE HYDROCHLORIDE 4 MG/.1ML
0.1 SPRAY NASAL
Qty: 0 | Refills: 0 | Status: DISCONTINUED | OUTPATIENT
Start: 2018-04-25 | End: 2018-04-26

## 2018-04-25 RX ORDER — DIPHENHYDRAMINE HCL 50 MG
25 CAPSULE ORAL EVERY 6 HOURS
Qty: 0 | Refills: 0 | Status: DISCONTINUED | OUTPATIENT
Start: 2018-04-25 | End: 2018-04-28

## 2018-04-25 RX ORDER — OXYCODONE HYDROCHLORIDE 5 MG/1
5 TABLET ORAL
Qty: 0 | Refills: 0 | Status: DISCONTINUED | OUTPATIENT
Start: 2018-04-25 | End: 2018-04-26

## 2018-04-25 RX ORDER — OXYTOCIN 10 UNIT/ML
41.67 VIAL (ML) INJECTION
Qty: 20 | Refills: 0 | Status: DISCONTINUED | OUTPATIENT
Start: 2018-04-25 | End: 2018-04-26

## 2018-04-25 RX ORDER — AMPICILLIN TRIHYDRATE 250 MG
1 CAPSULE ORAL EVERY 4 HOURS
Qty: 0 | Refills: 0 | Status: DISCONTINUED | OUTPATIENT
Start: 2018-04-25 | End: 2018-04-25

## 2018-04-25 RX ORDER — SIMETHICONE 80 MG/1
80 TABLET, CHEWABLE ORAL EVERY 4 HOURS
Qty: 0 | Refills: 0 | Status: DISCONTINUED | OUTPATIENT
Start: 2018-04-25 | End: 2018-04-28

## 2018-04-25 RX ORDER — OXYTOCIN 10 UNIT/ML
41.67 VIAL (ML) INJECTION
Qty: 20 | Refills: 0 | Status: DISCONTINUED | OUTPATIENT
Start: 2018-04-25 | End: 2018-04-25

## 2018-04-25 RX ORDER — HYDROMORPHONE HYDROCHLORIDE 2 MG/ML
1 INJECTION INTRAMUSCULAR; INTRAVENOUS; SUBCUTANEOUS
Qty: 0 | Refills: 0 | Status: DISCONTINUED | OUTPATIENT
Start: 2018-04-25 | End: 2018-04-26

## 2018-04-25 RX ORDER — OXYTOCIN 10 UNIT/ML
333.33 VIAL (ML) INJECTION
Qty: 20 | Refills: 0 | Status: DISCONTINUED | OUTPATIENT
Start: 2018-04-25 | End: 2018-04-26

## 2018-04-25 RX ORDER — OXYCODONE HYDROCHLORIDE 5 MG/1
5 TABLET ORAL EVERY 4 HOURS
Qty: 0 | Refills: 0 | Status: COMPLETED | OUTPATIENT
Start: 2018-04-25 | End: 2018-05-02

## 2018-04-25 RX ORDER — ONDANSETRON 8 MG/1
4 TABLET, FILM COATED ORAL ONCE
Qty: 0 | Refills: 0 | Status: DISCONTINUED | OUTPATIENT
Start: 2018-04-25 | End: 2018-04-26

## 2018-04-25 RX ORDER — OXYTOCIN 10 UNIT/ML
2 VIAL (ML) INJECTION
Qty: 30 | Refills: 0 | Status: DISCONTINUED | OUTPATIENT
Start: 2018-04-25 | End: 2018-04-25

## 2018-04-25 RX ORDER — SODIUM CHLORIDE 9 MG/ML
1000 INJECTION, SOLUTION INTRAVENOUS
Qty: 0 | Refills: 0 | Status: DISCONTINUED | OUTPATIENT
Start: 2018-04-25 | End: 2018-04-25

## 2018-04-25 RX ORDER — ONDANSETRON 8 MG/1
4 TABLET, FILM COATED ORAL EVERY 6 HOURS
Qty: 0 | Refills: 0 | Status: DISCONTINUED | OUTPATIENT
Start: 2018-04-25 | End: 2018-04-26

## 2018-04-25 RX ORDER — CITRIC ACID/SODIUM CITRATE 300-500 MG
15 SOLUTION, ORAL ORAL EVERY 4 HOURS
Qty: 0 | Refills: 0 | Status: DISCONTINUED | OUTPATIENT
Start: 2018-04-25 | End: 2018-04-25

## 2018-04-25 RX ORDER — MORPHINE SULFATE 50 MG/1
0.15 CAPSULE, EXTENDED RELEASE ORAL ONCE
Qty: 0 | Refills: 0 | Status: DISCONTINUED | OUTPATIENT
Start: 2018-04-25 | End: 2018-04-26

## 2018-04-25 RX ORDER — OXYCODONE HYDROCHLORIDE 5 MG/1
5 TABLET ORAL
Qty: 0 | Refills: 0 | Status: COMPLETED | OUTPATIENT
Start: 2018-04-25 | End: 2018-05-02

## 2018-04-25 RX ORDER — KETOROLAC TROMETHAMINE 30 MG/ML
30 SYRINGE (ML) INJECTION EVERY 6 HOURS
Qty: 0 | Refills: 0 | Status: DISCONTINUED | OUTPATIENT
Start: 2018-04-25 | End: 2018-04-26

## 2018-04-25 RX ORDER — ACETAMINOPHEN 500 MG
975 TABLET ORAL EVERY 6 HOURS
Qty: 0 | Refills: 0 | Status: DISCONTINUED | OUTPATIENT
Start: 2018-04-25 | End: 2018-04-28

## 2018-04-25 RX ORDER — SODIUM CHLORIDE 9 MG/ML
1000 INJECTION, SOLUTION INTRAVENOUS
Qty: 0 | Refills: 0 | Status: DISCONTINUED | OUTPATIENT
Start: 2018-04-25 | End: 2018-04-26

## 2018-04-25 RX ORDER — OXYTOCIN 10 UNIT/ML
333.33 VIAL (ML) INJECTION
Qty: 20 | Refills: 0 | Status: DISCONTINUED | OUTPATIENT
Start: 2018-04-25 | End: 2018-04-25

## 2018-04-25 RX ORDER — MORPHINE SULFATE 50 MG/1
4 CAPSULE, EXTENDED RELEASE ORAL
Qty: 0 | Refills: 0 | Status: DISCONTINUED | OUTPATIENT
Start: 2018-04-25 | End: 2018-04-26

## 2018-04-25 RX ORDER — LANOLIN
1 OINTMENT (GRAM) TOPICAL
Qty: 0 | Refills: 0 | Status: DISCONTINUED | OUTPATIENT
Start: 2018-04-25 | End: 2018-04-28

## 2018-04-25 RX ORDER — FERROUS SULFATE 325(65) MG
325 TABLET ORAL DAILY
Qty: 0 | Refills: 0 | Status: DISCONTINUED | OUTPATIENT
Start: 2018-04-25 | End: 2018-04-26

## 2018-04-25 RX ORDER — HEPARIN SODIUM 5000 [USP'U]/ML
5000 INJECTION INTRAVENOUS; SUBCUTANEOUS EVERY 12 HOURS
Qty: 0 | Refills: 0 | Status: DISCONTINUED | OUTPATIENT
Start: 2018-04-25 | End: 2018-04-28

## 2018-04-25 RX ORDER — GLYCERIN ADULT
1 SUPPOSITORY, RECTAL RECTAL AT BEDTIME
Qty: 0 | Refills: 0 | Status: DISCONTINUED | OUTPATIENT
Start: 2018-04-25 | End: 2018-04-28

## 2018-04-25 RX ADMIN — SODIUM CHLORIDE 125 MILLILITER(S): 9 INJECTION, SOLUTION INTRAVENOUS at 06:40

## 2018-04-25 RX ADMIN — Medication 2 MILLIUNIT(S)/MIN: at 10:07

## 2018-04-25 RX ADMIN — Medication 125 MILLIUNIT(S)/MIN: at 15:56

## 2018-04-25 RX ADMIN — Medication 108 GRAM(S): at 08:03

## 2018-04-25 RX ADMIN — Medication 108 GRAM(S): at 12:09

## 2018-04-25 RX ADMIN — Medication 208 GRAM(S): at 02:40

## 2018-04-25 RX ADMIN — Medication 30 MILLIGRAM(S): at 20:44

## 2018-04-25 RX ADMIN — HEPARIN SODIUM 5000 UNIT(S): 5000 INJECTION INTRAVENOUS; SUBCUTANEOUS at 23:34

## 2018-04-25 NOTE — PROVIDER CONTACT NOTE (OTHER) - RECOMMENDATIONS
Sit patient up and continue with incentive spirometer. Notify of any changes. Monitor BP and notify if BP remains in 140s.

## 2018-04-25 NOTE — DISCHARGE NOTE OB - CARE PROVIDER_API CALL
Chuy Kebede), Obstetrics and Gynecology  [address] [facility]  [address]nd [place] [address]  Phone: [phone]  Fax: [phone]

## 2018-04-25 NOTE — PROVIDER CONTACT NOTE (OTHER) - SITUATION
Pt s/p c/s SPO2 90-93 on room air. /84 at 1730. MD Claudio notified and at bedside to assess patient.

## 2018-04-25 NOTE — DISCHARGE NOTE OB - CARE PLAN
Principal Discharge DX:	 delivery delivered  Goal:	recovery  Assessment and plan of treatment:	pelvic rest, regular diet

## 2018-04-25 NOTE — DISCHARGE NOTE OB - MEDICATION SUMMARY - MEDICATIONS TO TAKE
I will START or STAY ON the medications listed below when I get home from the hospital:    acetaminophen 325 mg oral tablet  -- 3 tab(s) by mouth every 6 hours  -- Indication: For Pain    ibuprofen 600 mg oral tablet  -- 1 tab(s) by mouth every 6 hours  -- Indication: For Pain    Prenatal Multivitamins with Folic Acid 1 mg oral tablet  -- 1 tab(s) by mouth once a day  -- Indication: For Post partum

## 2018-04-25 NOTE — DISCHARGE NOTE OB - PATIENT PORTAL LINK FT
You can access the [facility] Patient Portal, offered by [facility], by registering with the following website: [email or web address]

## 2018-04-25 NOTE — CHART NOTE - NSCHARTNOTEFT_GEN_A_CORE
R2 Note    Called to bedside to evaluate pt for O2 saturation of 91-93% on RA. Pt denies any SOB, CP, n/v, fever/chills. Pt complains of being "sore" but otherwise has no complaints.     Vital Signs Last 24 Hrs  T(C): 37.6 (2018 15:30), Max: 37.6 (2018 15:30)  T(F): 99.7 (2018 15:30), Max: 99.7 (2018 15:30)  HR: 95 (2018 16:45) (95 - 115)  BP: 138/85 (2018 16:45) (127/83 - 149/64)  BP(mean): 95 (2018 16:45) (81 - 95)  RR: 21 (2018 16:45) (16 - 26)  SpO2: 92% (2018 16:45) (92% - 96%)    Gen:AAOx3  CV:RRR  Pulm:CTAB/L  Abd:soft, appropriately tender, ND, fundus firm  Gyn: small amount of blood on pad  Ext:b/l LE edema, no calf tenderness    A/P: 28 y/o  s/p pLTCS for arrest of dilation with decreased O2 saturation in PACU, otherwise asymptomatic. Low O2 saturation likely 2/2 atelectasis.  -pt repositioned/placed into sitting position  -IS provided, pt counseled on use - O2 saturation increased to 95-97% at bedside during evaluation  -continue to monitor VS    d/w Dr. Delio Boucher, pgy2

## 2018-04-25 NOTE — DISCHARGE NOTE OB - MATERIALS PROVIDED
[facility] Hearing Screen Program/[facility] [place] Screening Program/Bottle Feeding Log/Tdap Vaccination (VIS Pub Date: 2012)/[place]  Immunization Record/Vaccinations/Guide to Postpartum Care

## 2018-04-26 LAB
HCT VFR BLD CALC: 29 % — LOW (ref 34.5–45)
HGB BLD-MCNC: 9.6 G/DL — LOW (ref 11.5–15.5)
MCHC RBC-ENTMCNC: 27.8 PG — SIGNIFICANT CHANGE UP (ref 27–34)
MCHC RBC-ENTMCNC: 33.1 % — SIGNIFICANT CHANGE UP (ref 32–36)
MCV RBC AUTO: 84.1 FL — SIGNIFICANT CHANGE UP (ref 80–100)
NRBC # FLD: 0 — SIGNIFICANT CHANGE UP
PLATELET # BLD AUTO: 176 K/UL — SIGNIFICANT CHANGE UP (ref 150–400)
PMV BLD: 11.2 FL — SIGNIFICANT CHANGE UP (ref 7–13)
RBC # BLD FETUS AUTO: 0 — SIGNIFICANT CHANGE UP
RBC # BLD: 3.45 M/UL — LOW (ref 3.8–5.2)
RBC # FLD: 17.2 % — HIGH (ref 10.3–14.5)
WBC # BLD: 13.31 K/UL — HIGH (ref 3.8–10.5)
WBC # FLD AUTO: 13.31 K/UL — HIGH (ref 3.8–10.5)

## 2018-04-26 RX ORDER — FERROUS SULFATE 325(65) MG
325 TABLET ORAL THREE TIMES A DAY
Qty: 0 | Refills: 0 | Status: DISCONTINUED | OUTPATIENT
Start: 2018-04-26 | End: 2018-04-28

## 2018-04-26 RX ORDER — ASCORBIC ACID 60 MG
500 TABLET,CHEWABLE ORAL DAILY
Qty: 0 | Refills: 0 | Status: DISCONTINUED | OUTPATIENT
Start: 2018-04-26 | End: 2018-04-28

## 2018-04-26 RX ORDER — DOCUSATE SODIUM 100 MG
100 CAPSULE ORAL
Qty: 0 | Refills: 0 | Status: DISCONTINUED | OUTPATIENT
Start: 2018-04-26 | End: 2018-04-28

## 2018-04-26 RX ORDER — IBUPROFEN 200 MG
600 TABLET ORAL EVERY 6 HOURS
Qty: 0 | Refills: 0 | Status: DISCONTINUED | OUTPATIENT
Start: 2018-04-26 | End: 2018-04-28

## 2018-04-26 RX ORDER — OXYCODONE HYDROCHLORIDE 5 MG/1
5 TABLET ORAL
Qty: 0 | Refills: 0 | Status: DISCONTINUED | OUTPATIENT
Start: 2018-04-26 | End: 2018-04-28

## 2018-04-26 RX ORDER — OXYCODONE HYDROCHLORIDE 5 MG/1
5 TABLET ORAL EVERY 4 HOURS
Qty: 0 | Refills: 0 | Status: DISCONTINUED | OUTPATIENT
Start: 2018-04-26 | End: 2018-04-28

## 2018-04-26 RX ADMIN — Medication 30 MILLIGRAM(S): at 09:27

## 2018-04-26 RX ADMIN — Medication 600 MILLIGRAM(S): at 15:11

## 2018-04-26 RX ADMIN — Medication 100 MILLIGRAM(S): at 17:45

## 2018-04-26 RX ADMIN — HEPARIN SODIUM 5000 UNIT(S): 5000 INJECTION INTRAVENOUS; SUBCUTANEOUS at 11:42

## 2018-04-26 RX ADMIN — Medication 30 MILLIGRAM(S): at 10:00

## 2018-04-26 RX ADMIN — Medication 30 MILLIGRAM(S): at 03:25

## 2018-04-26 RX ADMIN — Medication 975 MILLIGRAM(S): at 03:26

## 2018-04-26 RX ADMIN — Medication 600 MILLIGRAM(S): at 15:44

## 2018-04-26 RX ADMIN — SIMETHICONE 80 MILLIGRAM(S): 80 TABLET, CHEWABLE ORAL at 20:39

## 2018-04-26 RX ADMIN — Medication 975 MILLIGRAM(S): at 15:10

## 2018-04-26 RX ADMIN — Medication 600 MILLIGRAM(S): at 20:39

## 2018-04-26 RX ADMIN — Medication 325 MILLIGRAM(S): at 11:42

## 2018-04-26 RX ADMIN — Medication 975 MILLIGRAM(S): at 09:27

## 2018-04-26 RX ADMIN — Medication 30 MILLIGRAM(S): at 03:56

## 2018-04-26 RX ADMIN — Medication 325 MILLIGRAM(S): at 23:47

## 2018-04-26 RX ADMIN — Medication 975 MILLIGRAM(S): at 15:44

## 2018-04-26 RX ADMIN — Medication 975 MILLIGRAM(S): at 20:38

## 2018-04-26 RX ADMIN — SIMETHICONE 80 MILLIGRAM(S): 80 TABLET, CHEWABLE ORAL at 03:26

## 2018-04-26 RX ADMIN — Medication 600 MILLIGRAM(S): at 21:08

## 2018-04-26 RX ADMIN — Medication 975 MILLIGRAM(S): at 21:08

## 2018-04-26 RX ADMIN — Medication 1 TABLET(S): at 11:43

## 2018-04-26 RX ADMIN — Medication 500 MILLIGRAM(S): at 11:42

## 2018-04-26 RX ADMIN — Medication 975 MILLIGRAM(S): at 03:56

## 2018-04-26 RX ADMIN — Medication 975 MILLIGRAM(S): at 10:00

## 2018-04-26 RX ADMIN — SIMETHICONE 80 MILLIGRAM(S): 80 TABLET, CHEWABLE ORAL at 11:42

## 2018-04-26 RX ADMIN — HEPARIN SODIUM 5000 UNIT(S): 5000 INJECTION INTRAVENOUS; SUBCUTANEOUS at 23:47

## 2018-04-26 NOTE — PROGRESS NOTE ADULT - ATTENDING COMMENTS
Late entry: Pt seen 4/26 @ 6pm  Pt seen and examined  Agree with above note except mendez had since been removed

## 2018-04-26 NOTE — PROGRESS NOTE ADULT - PROBLEM SELECTOR PLAN 1
- check CBC  - continue with PO analgesia  - increase ambulation  - continue regular diet  - encourage incentive spirometry  - d/c IV fluids  - d/c andrea Spencer MD PGY1

## 2018-04-27 RX ADMIN — Medication 600 MILLIGRAM(S): at 12:22

## 2018-04-27 RX ADMIN — Medication 975 MILLIGRAM(S): at 03:23

## 2018-04-27 RX ADMIN — Medication 600 MILLIGRAM(S): at 12:53

## 2018-04-27 RX ADMIN — Medication 325 MILLIGRAM(S): at 18:02

## 2018-04-27 RX ADMIN — Medication 500 MILLIGRAM(S): at 12:22

## 2018-04-27 RX ADMIN — Medication 975 MILLIGRAM(S): at 12:53

## 2018-04-27 RX ADMIN — Medication 325 MILLIGRAM(S): at 06:23

## 2018-04-27 RX ADMIN — Medication 600 MILLIGRAM(S): at 18:32

## 2018-04-27 RX ADMIN — Medication 975 MILLIGRAM(S): at 03:54

## 2018-04-27 RX ADMIN — Medication 600 MILLIGRAM(S): at 03:54

## 2018-04-27 RX ADMIN — Medication 100 MILLIGRAM(S): at 06:23

## 2018-04-27 RX ADMIN — Medication 1 TABLET(S): at 12:22

## 2018-04-27 RX ADMIN — Medication 600 MILLIGRAM(S): at 18:02

## 2018-04-27 RX ADMIN — Medication 100 MILLIGRAM(S): at 18:01

## 2018-04-27 RX ADMIN — Medication 975 MILLIGRAM(S): at 12:22

## 2018-04-27 RX ADMIN — Medication 975 MILLIGRAM(S): at 18:01

## 2018-04-27 RX ADMIN — Medication 600 MILLIGRAM(S): at 03:24

## 2018-04-27 RX ADMIN — Medication 975 MILLIGRAM(S): at 18:32

## 2018-04-27 RX ADMIN — HEPARIN SODIUM 5000 UNIT(S): 5000 INJECTION INTRAVENOUS; SUBCUTANEOUS at 12:22

## 2018-04-28 VITALS
OXYGEN SATURATION: 100 % | SYSTOLIC BLOOD PRESSURE: 132 MMHG | DIASTOLIC BLOOD PRESSURE: 86 MMHG | RESPIRATION RATE: 18 BRPM | HEART RATE: 69 BPM | TEMPERATURE: 98 F

## 2018-04-28 RX ORDER — ACETAMINOPHEN 500 MG
3 TABLET ORAL
Qty: 0 | Refills: 0 | COMMUNITY
Start: 2018-04-28

## 2018-04-28 RX ORDER — IBUPROFEN 200 MG
1 TABLET ORAL
Qty: 0 | Refills: 0 | COMMUNITY
Start: 2018-04-28

## 2018-04-28 RX ADMIN — HEPARIN SODIUM 5000 UNIT(S): 5000 INJECTION INTRAVENOUS; SUBCUTANEOUS at 00:06

## 2018-04-28 RX ADMIN — Medication 975 MILLIGRAM(S): at 05:37

## 2018-04-28 RX ADMIN — Medication 325 MILLIGRAM(S): at 05:44

## 2018-04-28 RX ADMIN — Medication 600 MILLIGRAM(S): at 00:06

## 2018-04-28 RX ADMIN — Medication 600 MILLIGRAM(S): at 01:00

## 2018-04-28 RX ADMIN — Medication 975 MILLIGRAM(S): at 01:00

## 2018-04-28 RX ADMIN — Medication 975 MILLIGRAM(S): at 00:07

## 2018-04-28 RX ADMIN — Medication 100 MILLIGRAM(S): at 05:44

## 2018-04-28 NOTE — PROGRESS NOTE ADULT - SUBJECTIVE AND OBJECTIVE BOX
Patient seen and examined at bedside, no acute overnight events. No acute complaints, pain well controlled. Patient is ambulating and tolerating regular diet. Has not yet passed flatus or had BM. Tucker is still in place.     Vital Signs Last 24 Hours  T(C): 36.6 (04-26-18 @ 05:51), Max: 37.6 (04-25-18 @ 15:30)  HR: 96 (04-26-18 @ 05:51) (82 - 115)  BP: 106/62 (04-26-18 @ 05:51) (106/62 - 150/88)  RR: 16 (04-26-18 @ 05:51) (15 - 26)  SpO2: 98% (04-26-18 @ 05:51) (91% - 98%)    I&O's Summary    25 Apr 2018 07:01  -  26 Apr 2018 07:00  --------------------------------------------------------  IN: 4125 mL / OUT: 3980 mL / NET: 145 mL        Physical Exam:  General: NAD  Abdomen: soft, appropriately tender, non-distended, fundus firm  Incision: Pfannenstiel incision CDI, no drainage, no erythema, subcuticular suture closure, dermabond intact  Pelvic: lochia wnl    Labs:  Blood Type: B Negative  Antibody Screen: Negative  RPR: Negative               12.6   8.20  )-----------( 222      ( 04-24 @ 17:48 )             37.1         MEDICATIONS  (STANDING):  acetaminophen   Tablet. 975 milliGRAM(s) Oral every 6 hours  diphtheria/tetanus/pertussis (acellular) Vaccine (ADAcel) 0.5 milliLiter(s) IntraMuscular once  ferrous    sulfate 325 milliGRAM(s) Oral daily  heparin  Injectable 5000 Unit(s) SubCutaneous every 12 hours  ibuprofen  Tablet 600 milliGRAM(s) Oral every 6 hours  ketorolac   Injectable 30 milliGRAM(s) IV Push every 6 hours  lactated ringers. 1000 milliLiter(s) (125 mL/Hr) IV Continuous <Continuous>  morphine PF Spinal 0.15 milliGRAM(s) IntraThecal. once  oxyCODONE    IR 5 milliGRAM(s) Oral every 3 hours  oxytocin Infusion 41.667 milliUNIT(s)/Min (125 mL/Hr) IV Continuous <Continuous>  oxytocin Infusion 333.333 milliUNIT(s)/Min (1000 mL/Hr) IV Continuous <Continuous>  prenatal multivitamin 1 Tablet(s) Oral daily    MEDICATIONS  (PRN):  diphenhydrAMINE   Capsule 25 milliGRAM(s) Oral every 6 hours PRN Itching  docusate sodium 100 milliGRAM(s) Oral two times a day PRN Stool Softening  glycerin Suppository - Adult 1 Suppository(s) Rectal at bedtime PRN Constipation  HYDROmorphone  Injectable 1 milliGRAM(s) IV Push every 3 hours PRN Severe Pain  lanolin Ointment 1 Application(s) Topical every 3 hours PRN Sore Nipples  morphine  - Injectable 4 milliGRAM(s) IV Push every 10 minutes PRN Moderate Pain (4 - 6)  naloxone Injectable 0.1 milliGRAM(s) IV Push every 3 minutes PRN For ANY of the following changes in patient status:  A. RR LESS THAN 10 breaths per minute, B. Oxygen saturation LESS THAN 90%, C. Sedation score of 6  ondansetron Injectable 4 milliGRAM(s) IV Push every 6 hours PRN Nausea  ondansetron Injectable 4 milliGRAM(s) IV Push once PRN Nausea and/or Vomiting  oxyCODONE    IR 5 milliGRAM(s) Oral every 3 hours PRN Mild Pain  oxyCODONE    IR 10 milliGRAM(s) Oral every 3 hours PRN Moderate Pain  oxyCODONE    IR 5 milliGRAM(s) Oral every 4 hours PRN Severe Pain (7 - 10)  simethicone 80 milliGRAM(s) Chew every 4 hours PRN Gas
POST OP DAY  1  s/p   SECTION    SUBJECTIVE:    PAIN SCALE SCORE: [x] Refer to charted pain scores    THERAPY:  [  ] Spinal morphine   [ x ] Epidural morphine   [  ] IV PCA Hydromorphone 1 mg/ml    OBJECTIVE:     SEDATION SCORE:	  [ x ] Alert	    [  ] Drowsy        [  ] Arousable	[  ] Asleep	[  ] Unresponsive    Side Effects:	  [ x ] None	     [  ] Nausea        [  ] Pruritus        [  ] Weakness   [  ] Numbness        ASSESSMENT/ PLAN   [   ] Discontinue         [  ] Continue    [ x ] Change to prn Analgesics as per primary service.    DOCUMENTATION & VERIFICATION OF CURRENT MEDS [ x ] Done    COMMENTS: No Headache.
SUBJECTIVE:    Pain: Controlled    Complaints: None    MILESTONES:    Alert and Oriented x 3  [ x ]  Out of bed/ ambulating. [ x ]  Flatus:   Positive [ x ]  Negative [  ]  Bowel movement  [  ] Positive [  ] Negative   Voiding [x  ] Due to void [  ]   Tucker/Indwelling catheter in place [  ]  Diet: Regular [ x ]  Clears [  ]  NPO [  ]    Infant feeding:  Breast [  ]   Bottle [ x ]  Both [ x ]  Feeding related issues and/or concerns:      OBJECTIVE:  T(C): 36.8 (18 @ 05:29), Max: 36.9 (18 @ 13:35)  HR: 69 (18 @ 05:29) (69 - 90)  BP: 132/86 (18 @ 05:29) (116/82 - 132/86)  RR: 18 (18 @ 05:29) (16 - 18)  SpO2: 100% (18 @ 05:29) (99% - 100%)  Wt(kg): --          Blood Type: B Negative    RPR: Negative          MEDICATIONS  (STANDING):  acetaminophen   Tablet. 975 milliGRAM(s) Oral every 6 hours  ascorbic acid 500 milliGRAM(s) Oral daily  diphtheria/tetanus/pertussis (acellular) Vaccine (ADAcel) 0.5 milliLiter(s) IntraMuscular once  docusate sodium 100 milliGRAM(s) Oral two times a day  ferrous    sulfate 325 milliGRAM(s) Oral three times a day  heparin  Injectable 5000 Unit(s) SubCutaneous every 12 hours  ibuprofen  Tablet 600 milliGRAM(s) Oral every 6 hours  oxyCODONE    IR 5 milliGRAM(s) Oral every 3 hours  prenatal multivitamin 1 Tablet(s) Oral daily    MEDICATIONS  (PRN):  diphenhydrAMINE   Capsule 25 milliGRAM(s) Oral every 6 hours PRN Itching  glycerin Suppository - Adult 1 Suppository(s) Rectal at bedtime PRN Constipation  lanolin Ointment 1 Application(s) Topical every 3 hours PRN Sore Nipples  oxyCODONE    IR 5 milliGRAM(s) Oral every 4 hours PRN Severe Pain (7 - 10)  simethicone 80 milliGRAM(s) Chew every 4 hours PRN Gas        ASSESSMENT:    27y     G   1  P 1001        PO Day#  3        Delivery: Primary [ x ]    Repeat [  ]                                         Indication of procedure: Arrest of Dilatation    Condition: Stable    Past Medical History significant for: HPI: Hx. HSV, Anemia,       Current Issues:    Breasts:  Soft [x  ]   Engorged [  ]  Nipples:  Abdomen: Soft [ x ]   Distended [  ] Nontender [  ]   Bowel sounds :  Present [  ]  Absent [  ]   Fundus:  Firm [x  ]  Boggy [  ]  Abdominal incision: Clean, Dry and Intact [x  ]  Staples [  ] Steri Strips [ x ] Dermabond [  ] Sutures [  ]    Patient wearing abdominal binder for support.    Vaginal: Lochia:  Heavy [  ]  Moderate [ x ]   Scant [  ]  Extremities: Edema [  ] Negative Gregg's Sign [  ] Nontender Zenon  [ x ] Positive pedal pulses [  ]    Other relevant physical exam findings:      PLAN:    Plan: Increase ambulation, analgesia PRN and pain medication protocol standing oxycodone, ibuprofen and acetaminophen.    Diet: Regular diet    Continue routine post-operative and postpartum care.     Discharge Planning [ x ]    For discharge Today  [  x  ]    Consults:  Social Work [  ]  Lactation [ x ]  Other [         ]
She is a  27y woman who is now post-operative day 2.    Subjective:  The patient feels well.  She is ambulating.   She is tolerating regular diet.  She denies nausea and vomiting.  She is voiding.  Her pain is controlled.  She reports normal postpartum bleeding.      Objective:  Vital Signs Last 24 Hrs  T(C): 37.2 (2018 05:43), Max: 37.2 (2018 05:43)  T(F): 99 (2018 05:43), Max: 99 (2018 05:43)  HR: 85 (2018 05:43) (85 - 101)  BP: 130/80 (2018 05:43) (117/71 - 130/80)  BP(mean): --  RR: 16 (2018 05:43) (16 - 16)  SpO2: 97% (2018 05:43) (97% - 100%)    Constitutional: NAD  Breast: Soft, nontender, not engorged.  Abdomen: Soft, nontender, no distension, firm uterine fundus  Incision: Clean, dry, and intact  Pelvic: Normal lochia noted  Ext: No calf tenderness bilaterally    LABS:                        9.6    13.31 )-----------( 176      ( 2018 05:45 )             29.0                         12.6   8.20  )-----------( 222      ( 2018 17:48 )             37.1         Allergies    No Known Allergies    Intolerances      MEDICATIONS  (STANDING):  acetaminophen   Tablet. 975 milliGRAM(s) Oral every 6 hours  ascorbic acid 500 milliGRAM(s) Oral daily  diphtheria/tetanus/pertussis (acellular) Vaccine (ADAcel) 0.5 milliLiter(s) IntraMuscular once  docusate sodium 100 milliGRAM(s) Oral two times a day  ferrous    sulfate 325 milliGRAM(s) Oral three times a day  heparin  Injectable 5000 Unit(s) SubCutaneous every 12 hours  ibuprofen  Tablet 600 milliGRAM(s) Oral every 6 hours  oxyCODONE    IR 5 milliGRAM(s) Oral every 3 hours  prenatal multivitamin 1 Tablet(s) Oral daily    MEDICATIONS  (PRN):  diphenhydrAMINE   Capsule 25 milliGRAM(s) Oral every 6 hours PRN Itching  glycerin Suppository - Adult 1 Suppository(s) Rectal at bedtime PRN Constipation  lanolin Ointment 1 Application(s) Topical every 3 hours PRN Sore Nipples  oxyCODONE    IR 5 milliGRAM(s) Oral every 4 hours PRN Severe Pain (7 - 10)  simethicone 80 milliGRAM(s) Chew every 4 hours PRN Gas        Assessment and Plan  Pt stable POD #2 s/p  section  -continues postoperative and postpartum care  -encourage ambulation  Hyun JOVEL

## 2018-04-30 ENCOUNTER — INPATIENT (INPATIENT)
Facility: HOSPITAL | Age: 28
LOS: 0 days | Discharge: HOME | End: 2018-05-01
Attending: OBSTETRICS & GYNECOLOGY | Admitting: OBSTETRICS & GYNECOLOGY
Payer: MEDICAID

## 2018-04-30 VITALS
HEART RATE: 99 BPM | DIASTOLIC BLOOD PRESSURE: 901 MMHG | RESPIRATION RATE: 18 BRPM | SYSTOLIC BLOOD PRESSURE: 141 MMHG | OXYGEN SATURATION: 92 %

## 2018-04-30 DIAGNOSIS — Z98.890 OTHER SPECIFIED POSTPROCEDURAL STATES: Chronic | ICD-10-CM

## 2018-04-30 LAB
ALBUMIN SERPL ELPH-MCNC: 3.8 G/DL — SIGNIFICANT CHANGE UP (ref 3.3–5)
ALP SERPL-CCNC: 112 U/L — SIGNIFICANT CHANGE UP (ref 40–120)
ALT FLD-CCNC: 66 U/L — HIGH (ref 4–33)
APPEARANCE UR: CLEAR — SIGNIFICANT CHANGE UP
APTT BLD: 32.1 SEC — SIGNIFICANT CHANGE UP (ref 27.5–37.4)
AST SERPL-CCNC: 44 U/L — HIGH (ref 4–32)
BACTERIA # UR AUTO: SIGNIFICANT CHANGE UP
BASOPHILS # BLD AUTO: 0.02 K/UL — SIGNIFICANT CHANGE UP (ref 0–0.2)
BASOPHILS NFR BLD AUTO: 0.2 % — SIGNIFICANT CHANGE UP (ref 0–2)
BILIRUB SERPL-MCNC: 0.3 MG/DL — SIGNIFICANT CHANGE UP (ref 0.2–1.2)
BILIRUB UR-MCNC: NEGATIVE — SIGNIFICANT CHANGE UP
BLOOD UR QL VISUAL: HIGH
BUN SERPL-MCNC: 12 MG/DL — SIGNIFICANT CHANGE UP (ref 7–23)
CALCIUM SERPL-MCNC: 9 MG/DL — SIGNIFICANT CHANGE UP (ref 8.4–10.5)
CHLORIDE SERPL-SCNC: 100 MMOL/L — SIGNIFICANT CHANGE UP (ref 98–107)
CO2 SERPL-SCNC: 24 MMOL/L — SIGNIFICANT CHANGE UP (ref 22–31)
COLOR SPEC: SIGNIFICANT CHANGE UP
CREAT ?TM UR-MCNC: 35.32 MG/DL — SIGNIFICANT CHANGE UP
CREAT SERPL-MCNC: 0.64 MG/DL — SIGNIFICANT CHANGE UP (ref 0.5–1.3)
EOSINOPHIL # BLD AUTO: 0.07 K/UL — SIGNIFICANT CHANGE UP (ref 0–0.5)
EOSINOPHIL NFR BLD AUTO: 0.7 % — SIGNIFICANT CHANGE UP (ref 0–6)
FIBRINOGEN PPP-MCNC: 663.6 MG/DL — HIGH (ref 310–510)
GLUCOSE SERPL-MCNC: 88 MG/DL — SIGNIFICANT CHANGE UP (ref 70–99)
GLUCOSE UR-MCNC: NEGATIVE — SIGNIFICANT CHANGE UP
HCT VFR BLD CALC: 36.8 % — SIGNIFICANT CHANGE UP (ref 34.5–45)
HGB BLD-MCNC: 12.1 G/DL — SIGNIFICANT CHANGE UP (ref 11.5–15.5)
IMM GRANULOCYTES # BLD AUTO: 0.05 # — SIGNIFICANT CHANGE UP
IMM GRANULOCYTES NFR BLD AUTO: 0.5 % — SIGNIFICANT CHANGE UP (ref 0–1.5)
INR BLD: 0.96 — SIGNIFICANT CHANGE UP (ref 0.88–1.17)
KETONES UR-MCNC: NEGATIVE — SIGNIFICANT CHANGE UP
LDH SERPL L TO P-CCNC: 284 U/L — HIGH (ref 135–225)
LEUKOCYTE ESTERASE UR-ACNC: HIGH
LYMPHOCYTES # BLD AUTO: 1.22 K/UL — SIGNIFICANT CHANGE UP (ref 1–3.3)
LYMPHOCYTES # BLD AUTO: 11.7 % — LOW (ref 13–44)
MCHC RBC-ENTMCNC: 28 PG — SIGNIFICANT CHANGE UP (ref 27–34)
MCHC RBC-ENTMCNC: 32.9 % — SIGNIFICANT CHANGE UP (ref 32–36)
MCV RBC AUTO: 85.2 FL — SIGNIFICANT CHANGE UP (ref 80–100)
MONOCYTES # BLD AUTO: 0.48 K/UL — SIGNIFICANT CHANGE UP (ref 0–0.9)
MONOCYTES NFR BLD AUTO: 4.6 % — SIGNIFICANT CHANGE UP (ref 2–14)
NEUTROPHILS # BLD AUTO: 8.55 K/UL — HIGH (ref 1.8–7.4)
NEUTROPHILS NFR BLD AUTO: 82.3 % — HIGH (ref 43–77)
NITRITE UR-MCNC: NEGATIVE — SIGNIFICANT CHANGE UP
NON-SQ EPI CELLS # UR AUTO: 1 — SIGNIFICANT CHANGE UP
NRBC # FLD: 0 — SIGNIFICANT CHANGE UP
PH UR: 7.5 — SIGNIFICANT CHANGE UP (ref 4.6–8)
PLATELET # BLD AUTO: 316 K/UL — SIGNIFICANT CHANGE UP (ref 150–400)
PMV BLD: 10.7 FL — SIGNIFICANT CHANGE UP (ref 7–13)
POTASSIUM SERPL-MCNC: 3.9 MMOL/L — SIGNIFICANT CHANGE UP (ref 3.5–5.3)
POTASSIUM SERPL-SCNC: 3.9 MMOL/L — SIGNIFICANT CHANGE UP (ref 3.5–5.3)
PROT SERPL-MCNC: 6.8 G/DL — SIGNIFICANT CHANGE UP (ref 6–8.3)
PROT UR-MCNC: 6.6 MG/DL — SIGNIFICANT CHANGE UP
PROT UR-MCNC: NEGATIVE MG/DL — SIGNIFICANT CHANGE UP
PROTHROM AB SERPL-ACNC: 11 SEC — SIGNIFICANT CHANGE UP (ref 9.8–13.1)
RBC # BLD: 4.32 M/UL — SIGNIFICANT CHANGE UP (ref 3.8–5.2)
RBC # FLD: 16.4 % — HIGH (ref 10.3–14.5)
RBC CASTS # UR COMP ASSIST: SIGNIFICANT CHANGE UP (ref 0–?)
SODIUM SERPL-SCNC: 140 MMOL/L — SIGNIFICANT CHANGE UP (ref 135–145)
SP GR SPEC: 1.01 — SIGNIFICANT CHANGE UP (ref 1–1.04)
SQUAMOUS # UR AUTO: SIGNIFICANT CHANGE UP
URATE SERPL-MCNC: 5.9 MG/DL — SIGNIFICANT CHANGE UP (ref 2.5–7)
UROBILINOGEN FLD QL: NORMAL MG/DL — SIGNIFICANT CHANGE UP
WBC # BLD: 10.39 K/UL — SIGNIFICANT CHANGE UP (ref 3.8–10.5)
WBC # FLD AUTO: 10.39 K/UL — SIGNIFICANT CHANGE UP (ref 3.8–10.5)
WBC UR QL: HIGH (ref 0–?)

## 2018-04-30 PROCEDURE — 99221 1ST HOSP IP/OBS SF/LOW 40: CPT | Mod: GC

## 2018-04-30 PROCEDURE — 99024 POSTOP FOLLOW-UP VISIT: CPT

## 2018-04-30 RX ORDER — SODIUM CHLORIDE 9 MG/ML
1000 INJECTION, SOLUTION INTRAVENOUS ONCE
Qty: 0 | Refills: 0 | Status: COMPLETED | OUTPATIENT
Start: 2018-04-30 | End: 2018-04-30

## 2018-04-30 RX ORDER — METOCLOPRAMIDE HCL 10 MG
10 TABLET ORAL ONCE
Qty: 0 | Refills: 0 | Status: COMPLETED | OUTPATIENT
Start: 2018-04-30 | End: 2018-04-30

## 2018-04-30 RX ORDER — MAGNESIUM SULFATE 500 MG/ML
2 VIAL (ML) INJECTION
Qty: 40 | Refills: 0 | Status: DISCONTINUED | OUTPATIENT
Start: 2018-04-30 | End: 2018-05-01

## 2018-04-30 RX ORDER — ACETAMINOPHEN 500 MG
975 TABLET ORAL EVERY 6 HOURS
Qty: 0 | Refills: 0 | Status: DISCONTINUED | OUTPATIENT
Start: 2018-04-30 | End: 2018-05-01

## 2018-04-30 RX ORDER — OXYCODONE HYDROCHLORIDE 5 MG/1
5 TABLET ORAL ONCE
Qty: 0 | Refills: 0 | Status: DISCONTINUED | OUTPATIENT
Start: 2018-04-30 | End: 2018-04-30

## 2018-04-30 RX ORDER — CEPHALEXIN 500 MG
500 CAPSULE ORAL
Qty: 0 | Refills: 0 | Status: DISCONTINUED | OUTPATIENT
Start: 2018-04-30 | End: 2018-04-30

## 2018-04-30 RX ORDER — ACETAMINOPHEN 500 MG
975 TABLET ORAL ONCE
Qty: 0 | Refills: 0 | Status: COMPLETED | OUTPATIENT
Start: 2018-04-30 | End: 2018-04-30

## 2018-04-30 RX ORDER — DICLOXACILLIN SODIUM 500 MG/1
500 CAPSULE ORAL
Qty: 0 | Refills: 0 | Status: DISCONTINUED | OUTPATIENT
Start: 2018-04-30 | End: 2018-05-01

## 2018-04-30 RX ORDER — MAGNESIUM SULFATE 500 MG/ML
4 VIAL (ML) INJECTION ONCE
Qty: 0 | Refills: 0 | Status: COMPLETED | OUTPATIENT
Start: 2018-04-30 | End: 2018-04-30

## 2018-04-30 RX ADMIN — SODIUM CHLORIDE 2000 MILLILITER(S): 9 INJECTION, SOLUTION INTRAVENOUS at 13:10

## 2018-04-30 RX ADMIN — DICLOXACILLIN SODIUM 500 MILLIGRAM(S): 500 CAPSULE ORAL at 21:33

## 2018-04-30 RX ADMIN — Medication 975 MILLIGRAM(S): at 14:02

## 2018-04-30 RX ADMIN — Medication 10 MILLIGRAM(S): at 13:15

## 2018-04-30 RX ADMIN — Medication 50 GM/HR: at 19:23

## 2018-04-30 RX ADMIN — Medication 50 GM/HR: at 17:02

## 2018-04-30 RX ADMIN — Medication 300 GRAM(S): at 14:45

## 2018-04-30 RX ADMIN — OXYCODONE HYDROCHLORIDE 5 MILLIGRAM(S): 5 TABLET ORAL at 20:55

## 2018-04-30 RX ADMIN — OXYCODONE HYDROCHLORIDE 5 MILLIGRAM(S): 5 TABLET ORAL at 21:25

## 2018-04-30 RX ADMIN — DICLOXACILLIN SODIUM 500 MILLIGRAM(S): 500 CAPSULE ORAL at 15:21

## 2018-04-30 NOTE — PROVIDER CONTACT NOTE (MEDICATION) - BACKGROUND
pt delivered via c section on 4/25. presents today with headach visual changes and nausea and vomitting

## 2018-04-30 NOTE — H&P ADULT - ASSESSMENT
Evidence of PEC and Mastitis  - to be treated with Magnesium Sulfate x 12 hours  - for dicloxacillin 500 mg orally four times daily x 7 days

## 2018-04-30 NOTE — H&P ADULT - HISTORY OF PRESENT ILLNESS
26 y.o.  s/p c section on  c/o elevated BP at home today of 150/100 @ 10am. Pt also reports HA, n/v/d, dizziness since discharge from the hospital on Saturday. Pt denies any fevers at home. Denies any sick contacts. Pt denies any abdominal pain. Denies any vision changes. Pt has been taking tylenol, last taken last night but did vomit soon after. Pt also c/o b/l breast pain and redness. Pt is currently bottle feeding.

## 2018-04-30 NOTE — PROGRESS NOTE ADULT - SUBJECTIVE AND OBJECTIVE BOX
26 y.o.  s/p c section on  c/o elevated BP at home today of 150/100 @ 10am. Pt also reports HA, n/v/d, dizziness since discharge from the hospital on Saturday. Pt denies any fevers at home. Denies any sick contacts. Pt denies any abdominal pain. Denies any vision changes. Pt has been taking tylenol, last taken last night but did vomit soon after. Pt also c/o b/l breast pain and redness. Pt is currently bottle feeding.    PMHx - denies  PShx - c/s , polypectomy, cerclage placement  Ob - 18 FTC/S failed IOL 8lb 1oz (s/p cerclage and endocervical polpectomy)  Gyn - HSV2  NKDA  Meds - PNV    abdomen soft and nontender  incision well approximated, no redness or tenderness on palpation  Breasts with redness bilaterally and painful to touch    BPs 131/87, 129/87, 126/89, 128/87    HELLP labs sent  IV fluids to be given  10 mg IV reglan ordered 26 y.o.  s/p c section on  c/o elevated BP at home today of 150/100 @ 10am. Pt also reports HA, n/v/d, dizziness since discharge from the hospital on Saturday. Pt denies any fevers at home. Denies any sick contacts. Pt denies any abdominal pain. Denies any vision changes. Pt has been taking tylenol, last taken last night but did vomit soon after. Pt also c/o b/l breast pain and redness. Pt is currently bottle feeding.    PMHx - denies  PShx - c/s , polypectomy, cerclage placement  Ob - 18 FTC/S failed IOL 8lb 1oz (s/p cerclage and endocervical polpectomy)  Gyn - HSV2  NKDA  Meds - PNV    abdomen soft and nontender  incision well approximated, no redness or tenderness on palpation  Breasts with redness bilaterally and painful to touch    BPs 131/87, 129/87, 126/89, 128/87  T 36.7 C  Pulse 96-98    HELLP labs sent  IV fluids to be given  10 mg IV reglan ordered

## 2018-04-30 NOTE — H&P ADULT - NSHPPHYSICALEXAM_GEN_ALL_CORE
abdomen soft and nontender  incision well approximated, no redness or tenderness on palpation  Breasts with redness bilaterally and painful to touch

## 2018-04-30 NOTE — H&P ADULT - NSHPLABSRESULTS_GEN_ALL_CORE
CBC: 10.39>12.1/36.8<316  Fibrinogen 663.6  AST/ALT: 44/66  LDH: 284  UA: 5.9  PCR ratio: .18  HELLP labs sent  IV fluids to be given  10 mg IV reglan ordered

## 2018-04-30 NOTE — H&P ADULT - PSH
delivery delivered  2018 for arrest of dilation  H/O cervical polypectomy    History of cervical cerclage

## 2018-05-01 ENCOUNTER — TRANSCRIPTION ENCOUNTER (OUTPATIENT)
Age: 28
End: 2018-05-01

## 2018-05-01 VITALS — DIASTOLIC BLOOD PRESSURE: 74 MMHG | HEART RATE: 95 BPM | RESPIRATION RATE: 18 BRPM | SYSTOLIC BLOOD PRESSURE: 108 MMHG

## 2018-05-01 LAB
ALBUMIN SERPL ELPH-MCNC: 4.3 G/DL — SIGNIFICANT CHANGE UP (ref 3.3–5)
ALP SERPL-CCNC: 136 U/L — HIGH (ref 40–120)
ALT FLD-CCNC: 73 U/L — HIGH (ref 4–33)
APTT BLD: 33.2 SEC — SIGNIFICANT CHANGE UP (ref 27.5–37.4)
AST SERPL-CCNC: 49 U/L — HIGH (ref 4–32)
BASOPHILS # BLD AUTO: 0.02 K/UL — SIGNIFICANT CHANGE UP (ref 0–0.2)
BASOPHILS NFR BLD AUTO: 0.2 % — SIGNIFICANT CHANGE UP (ref 0–2)
BILIRUB SERPL-MCNC: 0.3 MG/DL — SIGNIFICANT CHANGE UP (ref 0.2–1.2)
BUN SERPL-MCNC: 10 MG/DL — SIGNIFICANT CHANGE UP (ref 7–23)
CALCIUM SERPL-MCNC: 7.4 MG/DL — LOW (ref 8.4–10.5)
CHLORIDE SERPL-SCNC: 96 MMOL/L — LOW (ref 98–107)
CO2 SERPL-SCNC: 26 MMOL/L — SIGNIFICANT CHANGE UP (ref 22–31)
CREAT SERPL-MCNC: 0.7 MG/DL — SIGNIFICANT CHANGE UP (ref 0.5–1.3)
EOSINOPHIL # BLD AUTO: 0.11 K/UL — SIGNIFICANT CHANGE UP (ref 0–0.5)
EOSINOPHIL NFR BLD AUTO: 1.2 % — SIGNIFICANT CHANGE UP (ref 0–6)
FIBRINOGEN PPP-MCNC: 730.1 MG/DL — HIGH (ref 310–510)
GLUCOSE SERPL-MCNC: 90 MG/DL — SIGNIFICANT CHANGE UP (ref 70–99)
HCT VFR BLD CALC: 39.7 % — SIGNIFICANT CHANGE UP (ref 34.5–45)
HGB BLD-MCNC: 13.3 G/DL — SIGNIFICANT CHANGE UP (ref 11.5–15.5)
IMM GRANULOCYTES # BLD AUTO: 0.07 # — SIGNIFICANT CHANGE UP
IMM GRANULOCYTES NFR BLD AUTO: 0.8 % — SIGNIFICANT CHANGE UP (ref 0–1.5)
INR BLD: 0.91 — SIGNIFICANT CHANGE UP (ref 0.88–1.17)
LYMPHOCYTES # BLD AUTO: 1.3 K/UL — SIGNIFICANT CHANGE UP (ref 1–3.3)
LYMPHOCYTES # BLD AUTO: 14.5 % — SIGNIFICANT CHANGE UP (ref 13–44)
MCHC RBC-ENTMCNC: 28.3 PG — SIGNIFICANT CHANGE UP (ref 27–34)
MCHC RBC-ENTMCNC: 33.5 % — SIGNIFICANT CHANGE UP (ref 32–36)
MCV RBC AUTO: 84.5 FL — SIGNIFICANT CHANGE UP (ref 80–100)
MONOCYTES # BLD AUTO: 0.61 K/UL — SIGNIFICANT CHANGE UP (ref 0–0.9)
MONOCYTES NFR BLD AUTO: 6.8 % — SIGNIFICANT CHANGE UP (ref 2–14)
NEUTROPHILS # BLD AUTO: 6.85 K/UL — SIGNIFICANT CHANGE UP (ref 1.8–7.4)
NEUTROPHILS NFR BLD AUTO: 76.5 % — SIGNIFICANT CHANGE UP (ref 43–77)
NRBC # FLD: 0 — SIGNIFICANT CHANGE UP
PLATELET # BLD AUTO: 322 K/UL — SIGNIFICANT CHANGE UP (ref 150–400)
PMV BLD: 10.1 FL — SIGNIFICANT CHANGE UP (ref 7–13)
POTASSIUM SERPL-MCNC: 3.8 MMOL/L — SIGNIFICANT CHANGE UP (ref 3.5–5.3)
POTASSIUM SERPL-SCNC: 3.8 MMOL/L — SIGNIFICANT CHANGE UP (ref 3.5–5.3)
PROT SERPL-MCNC: 7.8 G/DL — SIGNIFICANT CHANGE UP (ref 6–8.3)
PROTHROM AB SERPL-ACNC: 10.4 SEC — SIGNIFICANT CHANGE UP (ref 9.8–13.1)
RBC # BLD: 4.7 M/UL — SIGNIFICANT CHANGE UP (ref 3.8–5.2)
RBC # FLD: 16.8 % — HIGH (ref 10.3–14.5)
SODIUM SERPL-SCNC: 139 MMOL/L — SIGNIFICANT CHANGE UP (ref 135–145)
URATE SERPL-MCNC: 6.5 MG/DL — SIGNIFICANT CHANGE UP (ref 2.5–7)
WBC # BLD: 8.96 K/UL — SIGNIFICANT CHANGE UP (ref 3.8–10.5)
WBC # FLD AUTO: 8.96 K/UL — SIGNIFICANT CHANGE UP (ref 3.8–10.5)

## 2018-05-01 PROCEDURE — 99238 HOSP IP/OBS DSCHRG MGMT 30/<: CPT

## 2018-05-01 RX ORDER — DICLOXACILLIN SODIUM 500 MG/1
1 CAPSULE ORAL
Qty: 28 | Refills: 0 | OUTPATIENT
Start: 2018-05-01 | End: 2018-05-07

## 2018-05-01 RX ORDER — HEPARIN SODIUM 5000 [USP'U]/ML
5000 INJECTION INTRAVENOUS; SUBCUTANEOUS EVERY 12 HOURS
Qty: 0 | Refills: 0 | Status: DISCONTINUED | OUTPATIENT
Start: 2018-05-01 | End: 2018-05-01

## 2018-05-01 RX ORDER — SODIUM CHLORIDE 9 MG/ML
1000 INJECTION, SOLUTION INTRAVENOUS
Qty: 0 | Refills: 0 | Status: DISCONTINUED | OUTPATIENT
Start: 2018-05-01 | End: 2018-05-01

## 2018-05-01 RX ORDER — IBUPROFEN 200 MG
600 TABLET ORAL ONCE
Qty: 0 | Refills: 0 | Status: DISCONTINUED | OUTPATIENT
Start: 2018-05-01 | End: 2018-05-01

## 2018-05-01 RX ADMIN — HEPARIN SODIUM 5000 UNIT(S): 5000 INJECTION INTRAVENOUS; SUBCUTANEOUS at 06:19

## 2018-05-01 RX ADMIN — SODIUM CHLORIDE 50 MILLILITER(S): 9 INJECTION, SOLUTION INTRAVENOUS at 06:09

## 2018-05-01 RX ADMIN — DICLOXACILLIN SODIUM 500 MILLIGRAM(S): 500 CAPSULE ORAL at 09:43

## 2018-05-01 RX ADMIN — DICLOXACILLIN SODIUM 500 MILLIGRAM(S): 500 CAPSULE ORAL at 03:36

## 2018-05-01 RX ADMIN — DICLOXACILLIN SODIUM 500 MILLIGRAM(S): 500 CAPSULE ORAL at 16:02

## 2018-05-01 NOTE — DISCHARGE NOTE OB - CARE PLAN
Principal Discharge DX:	Preeclampsia in postpartum period  Goal:	Return to normal function  Assessment and plan of treatment:	1) Make a follow up appointment with Dr. Kebede in 2-3 days  2) Check your blood pressure twice a day  3) Return to L&D if you develop severe headache, elevated BPs greater than 160/110, or blurry vision

## 2018-05-01 NOTE — PROGRESS NOTE ADULT - ATTENDING COMMENTS
Readmit POD #5 with severe preeclampsia s/p Magnesium. Pt reports feeling groggy, anxious to go home after difficult pregnancy and delivery.    Regular diet  SOcial work consult  D/C planning

## 2018-05-01 NOTE — DISCHARGE NOTE OB - PATIENT PORTAL LINK FT
You can access the [facility], offered by [facility], by registering with the following website: [email or web address]

## 2018-05-01 NOTE — DISCHARGE NOTE OB - REASON FOR ADMISSION
elevated BP at home today of 150/100 @ 10am. Pt also reports HA, n/v/d, dizziness since discharge from the hospital on Saturday

## 2018-05-01 NOTE — PROGRESS NOTE ADULT - PROBLEM SELECTOR PLAN 1
-cont Mg  -monitor BPs  -f/u AM HELLP labs  -dicloxacillin for mastitis  -reg diet  -dvt ppx w/ hsq  -routine pp care  Cris Nielsen R3 -cont Mg  -monitor BPs  -f/u AM HELLP labs  -dicloxacillin for mastitis  -reg diet  -dvt ppx w/ hsq  -routine pp care  -consider imaging if h/a does not resolve  Cris PAUL

## 2018-05-01 NOTE — PROGRESS NOTE ADULT - SUBJECTIVE AND OBJECTIVE BOX
INTERVAL HPI/OVERNIGHT EVENTS: Pt seen and examined at bedside. No events overnight. Headache after receiving oxycdone is a 4/10.  Ambulating, passing flatus, tolerating regular diet, pain controlled with analgesia, urinating spontaneously  SHe denies nausea/vomiting/fever/chills/chest pain/SOB/dizziness. Denies visual changes RUQ pain.    MEDICATIONS  (STANDING):  acetaminophen   Tablet. 975 milliGRAM(s) Oral every 6 hours  dicloxacillin 500 milliGRAM(s) Oral four times a day  magnesium sulfate Infusion 2 Gm/Hr (50 mL/Hr) IV Continuous <Continuous>    MEDICATIONS  (PRN):      12 point ROS negative except as outlined above    Vital Signs Last 24 Hrs  T(C): 36.6 (01 May 2018 02:00), Max: 36.8 (2018 20:00)  T(F): 97.9 (01 May 2018 02:00), Max: 98.2 (2018 20:00)  HR: 101 (01 May 2018 04:30) (67 - 104)  BP: 112/75 (01 May 2018 04:30) (97/61 - 156/84)  BP(mean): --  RR: 17 (01 May 2018 04:30) (15 - 18)  SpO2: 100% (01 May 2018 04:30) (92% - 100%)    I&O's Summary        PHYSICAL EXAM:    GA: NAD, A+0 x 3  CV: RRR  Pulm: CTA BL  Abd: soft, nontender, nondistended, no rebound or guarding,   Incision: clean, dry and intact  Uterus: Fundus midline; firm  Extremities: ntbl  Lines:      LABS:                          12.1   10.39 )-----------( 316      ( 2018 12:50 )             36.8   baso 0.2    eos 0.7    imm gran 0.5    lymph 11.7   mono 4.6    poly 82.3         PT/INR - ( 2018 12:50 )   PT: 11.0 SEC;   INR: 0.96          PTT - ( 2018 12:50 )  PTT:32.1 SEC  Urinalysis Basic - ( 2018 12:50 )    Color: PLYEL / Appearance: CLEAR / S.009 / pH: 7.5  Gluc: NEGATIVE / Ketone: NEGATIVE  / Bili: NEGATIVE / Urobili: NORMAL mg/dL   Blood: MODERATE / Protein: NEGATIVE mg/dL / Nitrite: NEGATIVE   Leuk Esterase: SMALL / RBC: 2-5 / WBC 5-10   Sq Epi: OCC / Non Sq Epi: x / Bacteria: FEW            RADIOLOGY & ADDITIONAL TESTS:

## 2018-05-01 NOTE — DISCHARGE NOTE OB - HOSPITAL COURSE
26 yo  s/p rLTCS(), readmitted with severe preeclampsia. At the time of admission pt had elevated blood pressures, headache and blurry vision. Magnesium was started for neuroprotection and HELLP labs were obtained which were within normal limits. Pt's blood pressures were controlled without  anti-hypertensive agents and her headache resolved. Pt was discharged home in stable condition on hospital day #2. She will have close follow up with her OB/GYN.

## 2018-05-01 NOTE — DISCHARGE NOTE OB - MEDICATION SUMMARY - MEDICATIONS TO TAKE
I will START or STAY ON the medications listed below when I get home from the hospital:    acetaminophen 325 mg oral tablet  -- 3 tab(s) by mouth every 6 hours  -- Indication: For Pain    ibuprofen 600 mg oral tablet  -- 1 tab(s) by mouth every 6 hours  -- Indication: For Pain    Prenatal Multivitamins with Folic Acid 1 mg oral tablet  -- 1 tab(s) by mouth once a day  -- Indication: For PNV

## 2018-05-01 NOTE — DISCHARGE NOTE OB - PLAN OF CARE
Return to normal function 1) Make a follow up appointment with Dr. Kebede in 2-3 days  2) Check your blood pressure twice a day  3) Return to L&D if you develop severe headache, elevated BPs greater than 160/110, or blurry vision

## 2018-05-01 NOTE — DISCHARGE NOTE OB - CARE PROVIDER_API CALL
Chuy Kebede), Obstetrics and Gynecology  [address] [facility]  [address] [place] [address]  Phone: [phone]  Fax: [phone]

## 2018-05-04 ENCOUNTER — APPOINTMENT (OUTPATIENT)
Dept: OBGYN | Facility: CLINIC | Age: 28
End: 2018-05-04
Payer: MEDICAID

## 2018-05-04 VITALS
WEIGHT: 127 LBS | SYSTOLIC BLOOD PRESSURE: 110 MMHG | DIASTOLIC BLOOD PRESSURE: 80 MMHG | BODY MASS INDEX: 25.6 KG/M2 | HEIGHT: 59 IN

## 2018-05-04 DIAGNOSIS — O48.0 POST-TERM PREGNANCY: ICD-10-CM

## 2018-05-04 DIAGNOSIS — Z87.440 PERSONAL HISTORY OF URINARY (TRACT) INFECTIONS: ICD-10-CM

## 2018-05-04 DIAGNOSIS — Z34.93 ENCOUNTER FOR SUPERVISION OF NORMAL PREGNANCY, UNSPECIFIED, THIRD TRIMESTER: ICD-10-CM

## 2018-05-04 DIAGNOSIS — O09.899 SUPERVISION OF OTHER HIGH RISK PREGNANCIES, UNSPECIFIED TRIMESTER: ICD-10-CM

## 2018-05-04 DIAGNOSIS — N84.1 POLYP OF CERVIX UTERI: ICD-10-CM

## 2018-05-04 DIAGNOSIS — B95.1 UNSPECIFIED INFECTION OF URINARY TRACT IN PREGNANCY, UNSPECIFIED TRIMESTER: ICD-10-CM

## 2018-05-04 DIAGNOSIS — O23.40 UNSPECIFIED INFECTION OF URINARY TRACT IN PREGNANCY, UNSPECIFIED TRIMESTER: ICD-10-CM

## 2018-05-04 DIAGNOSIS — O34.30 MATERNAL CARE FOR CERVICAL INCOMPETENCE, UNSPECIFIED TRIMESTER: ICD-10-CM

## 2018-05-04 DIAGNOSIS — O99.019 ANEMIA COMPLICATING PREGNANCY, UNSPECIFIED TRIMESTER: ICD-10-CM

## 2018-05-04 DIAGNOSIS — Z67.91 SUPERVISION OF OTHER HIGH RISK PREGNANCIES, UNSPECIFIED TRIMESTER: ICD-10-CM

## 2018-05-04 PROCEDURE — 36415 COLL VENOUS BLD VENIPUNCTURE: CPT

## 2018-05-04 PROCEDURE — 0503F POSTPARTUM CARE VISIT: CPT

## 2018-05-04 RX ORDER — DEXTROMETHORPHAN POLISTIREX 30 MG/5ML
30 SUSPENSION, EXTENDED RELEASE ORAL
Qty: 1 | Refills: 0 | Status: DISCONTINUED | COMMUNITY
Start: 2017-12-29 | End: 2018-05-04

## 2018-05-04 RX ORDER — SODIUM CHLORIDE 0.65 %
0.65 AEROSOL, SPRAY (ML) NASAL TWICE DAILY
Qty: 1 | Refills: 1 | Status: DISCONTINUED | COMMUNITY
Start: 2017-12-29 | End: 2018-05-04

## 2018-05-04 RX ORDER — FERROUS SULFATE 325(65) MG
325 (65 FE) TABLET ORAL TWICE DAILY
Qty: 60 | Refills: 11 | Status: DISCONTINUED | COMMUNITY
Start: 2018-01-10 | End: 2018-05-04

## 2018-05-04 RX ORDER — DOCUSATE SODIUM 100 MG/1
100 CAPSULE ORAL TWICE DAILY
Qty: 60 | Refills: 11 | Status: DISCONTINUED | COMMUNITY
Start: 2018-01-10 | End: 2018-05-04

## 2018-05-04 RX ORDER — HUMAN RHO(D) IMMUNE GLOBULIN 300 UG/1
1500 INJECTION, SOLUTION INTRAMUSCULAR
Qty: 1 | Refills: 1 | Status: DISCONTINUED | COMMUNITY
Start: 2017-08-17 | End: 2018-05-04

## 2018-05-05 LAB
ALBUMIN SERPL ELPH-MCNC: 4.6 G/DL
ALP BLD-CCNC: 120 U/L
ALT SERPL-CCNC: 49 U/L
ANION GAP SERPL CALC-SCNC: 21 MMOL/L
AST SERPL-CCNC: 22 U/L
BASOPHILS # BLD AUTO: 0.02 K/UL
BASOPHILS NFR BLD AUTO: 0.2 %
BILIRUB SERPL-MCNC: 0.3 MG/DL
BUN SERPL-MCNC: 15 MG/DL
CALCIUM SERPL-MCNC: 10 MG/DL
CHLORIDE SERPL-SCNC: 100 MMOL/L
CO2 SERPL-SCNC: 19 MMOL/L
CREAT SERPL-MCNC: 0.73 MG/DL
EOSINOPHIL # BLD AUTO: 0.11 K/UL
EOSINOPHIL NFR BLD AUTO: 1.3 %
GLUCOSE SERPL-MCNC: 80 MG/DL
HCT VFR BLD CALC: 44.7 %
HGB BLD-MCNC: 13.9 G/DL
IMM GRANULOCYTES NFR BLD AUTO: 0.2 %
LYMPHOCYTES # BLD AUTO: 2.02 K/UL
LYMPHOCYTES NFR BLD AUTO: 24.7 %
MAN DIFF?: NORMAL
MCHC RBC-ENTMCNC: 28.3 PG
MCHC RBC-ENTMCNC: 31.1 GM/DL
MCV RBC AUTO: 91 FL
MONOCYTES # BLD AUTO: 0.47 K/UL
MONOCYTES NFR BLD AUTO: 5.8 %
NEUTROPHILS # BLD AUTO: 5.53 K/UL
NEUTROPHILS NFR BLD AUTO: 67.8 %
PLATELET # BLD AUTO: 426 K/UL
POTASSIUM SERPL-SCNC: 4.8 MMOL/L
PROT SERPL-MCNC: 7.2 G/DL
RBC # BLD: 4.91 M/UL
RBC # FLD: 18 %
SODIUM SERPL-SCNC: 140 MMOL/L
WBC # FLD AUTO: 8.17 K/UL

## 2018-06-05 ENCOUNTER — APPOINTMENT (OUTPATIENT)
Dept: OBGYN | Facility: CLINIC | Age: 28
End: 2018-06-05
Payer: MEDICAID

## 2018-06-05 VITALS
WEIGHT: 130 LBS | HEIGHT: 59 IN | SYSTOLIC BLOOD PRESSURE: 120 MMHG | DIASTOLIC BLOOD PRESSURE: 80 MMHG | BODY MASS INDEX: 26.21 KG/M2

## 2018-06-05 PROCEDURE — 0503F POSTPARTUM CARE VISIT: CPT

## 2018-06-05 RX ORDER — DICLOXACILLIN SODIUM 500 MG/1
500 CAPSULE ORAL
Qty: 28 | Refills: 0 | Status: DISCONTINUED | COMMUNITY
Start: 2018-05-01 | End: 2018-06-05

## 2018-06-08 LAB — BACTERIA UR CULT: NORMAL

## 2019-02-19 ENCOUNTER — APPOINTMENT (OUTPATIENT)
Dept: OBGYN | Facility: CLINIC | Age: 29
End: 2019-02-19

## 2019-06-26 ENCOUNTER — TRANSCRIPTION ENCOUNTER (OUTPATIENT)
Age: 29
End: 2019-06-26

## 2019-07-18 ENCOUNTER — OTHER (OUTPATIENT)
Age: 29
End: 2019-07-18

## 2019-08-13 ENCOUNTER — APPOINTMENT (OUTPATIENT)
Dept: FAMILY MEDICINE | Facility: CLINIC | Age: 29
End: 2019-08-13
Payer: MEDICAID

## 2019-08-13 VITALS
DIASTOLIC BLOOD PRESSURE: 80 MMHG | HEART RATE: 89 BPM | SYSTOLIC BLOOD PRESSURE: 120 MMHG | WEIGHT: 149 LBS | HEIGHT: 59 IN | OXYGEN SATURATION: 96 % | RESPIRATION RATE: 17 BRPM | BODY MASS INDEX: 30.04 KG/M2

## 2019-08-13 LAB
ALBUMIN SERPL ELPH-MCNC: 4.9 G/DL
ALP BLD-CCNC: 86 U/L
ALT SERPL-CCNC: 21 U/L
ANION GAP SERPL CALC-SCNC: 14 MMOL/L
APPEARANCE: CLEAR
AST SERPL-CCNC: 19 U/L
BASOPHILS # BLD AUTO: 0.02 K/UL
BASOPHILS NFR BLD AUTO: 0.4 %
BILIRUB SERPL-MCNC: 0.4 MG/DL
BILIRUBIN URINE: NEGATIVE
BLOOD URINE: NORMAL
BUN SERPL-MCNC: 13 MG/DL
CALCIUM SERPL-MCNC: 9.8 MG/DL
CHLORIDE SERPL-SCNC: 97 MMOL/L
CHOLEST SERPL-MCNC: 162 MG/DL
CHOLEST/HDLC SERPL: 3.4 RATIO
CO2 SERPL-SCNC: 29 MMOL/L
COLOR: NORMAL
CREAT SERPL-MCNC: 0.66 MG/DL
EOSINOPHIL # BLD AUTO: 0.08 K/UL
EOSINOPHIL NFR BLD AUTO: 1.5 %
ESTIMATED AVERAGE GLUCOSE: 103 MG/DL
GLUCOSE QUALITATIVE U: NEGATIVE
GLUCOSE SERPL-MCNC: 88 MG/DL
HBA1C MFR BLD HPLC: 5.2 %
HCT VFR BLD CALC: 45.6 %
HDLC SERPL-MCNC: 48 MG/DL
HGB BLD-MCNC: 14.9 G/DL
IMM GRANULOCYTES NFR BLD AUTO: 0.4 %
KETONES URINE: NEGATIVE
LDLC SERPL CALC-MCNC: 77 MG/DL
LEUKOCYTE ESTERASE URINE: NEGATIVE
LYMPHOCYTES # BLD AUTO: 1.37 K/UL
LYMPHOCYTES NFR BLD AUTO: 26 %
MAN DIFF?: NORMAL
MCHC RBC-ENTMCNC: 27.5 PG
MCHC RBC-ENTMCNC: 32.7 GM/DL
MCV RBC AUTO: 84.1 FL
MONOCYTES # BLD AUTO: 0.42 K/UL
MONOCYTES NFR BLD AUTO: 8 %
NEUTROPHILS # BLD AUTO: 3.35 K/UL
NEUTROPHILS NFR BLD AUTO: 63.7 %
NITRITE URINE: NEGATIVE
PH URINE: 7
PLATELET # BLD AUTO: 272 K/UL
POTASSIUM SERPL-SCNC: 3.4 MMOL/L
PROT SERPL-MCNC: 7 G/DL
PROTEIN URINE: NEGATIVE
RBC # BLD: 5.42 M/UL
RBC # FLD: 14.4 %
SODIUM SERPL-SCNC: 140 MMOL/L
SPECIFIC GRAVITY URINE: 1.01
TRIGL SERPL-MCNC: 183 MG/DL
TSH SERPL-ACNC: 3.29 UIU/ML
UROBILINOGEN URINE: NORMAL
WBC # FLD AUTO: 5.26 K/UL

## 2019-08-13 PROCEDURE — 99385 PREV VISIT NEW AGE 18-39: CPT | Mod: 25

## 2019-08-13 PROCEDURE — 36415 COLL VENOUS BLD VENIPUNCTURE: CPT

## 2019-08-13 RX ORDER — NORGESTREL AND ETHINYL ESTRADIOL 0.3-0.03MG
0.3-3 KIT ORAL DAILY
Qty: 28 | Refills: 5 | Status: DISCONTINUED | COMMUNITY
Start: 2018-06-05 | End: 2019-08-13

## 2019-08-13 RX ORDER — VITAMIN C, CALCIUM, IRON, VITAMIN D3, VITAMIN E, THIAMIN, RIBOFLAVIN, NIACINAMIDE, VITAMIN B6, FOLIC ACID, IODINE, ZINC, COPPER, DOCUSATE SODIUM
27-1 & 250 KIT
Qty: 1 | Refills: 11 | Status: DISCONTINUED | COMMUNITY
Start: 2017-02-14 | End: 2019-08-13

## 2019-08-13 NOTE — ASSESSMENT
[FreeTextEntry1] : Patient was counseled on healthy eating habits, on daily exercise and stress relief. All medications and allergies were reviewed with the patient and any adjustments necessary were made. Patient was counseled to try engage in an exercise activity for at least 30 minutes 3-4 times a week.  Patient was advised to eat a diet low in fat and carbohydrates and high in protein, with plenty of vegetables. Patient was advised to try and engage in relaxing activities whenever possible.\par The patients blood was draw in office and will be followed and assessed for any issues.  Patient was told to return to the office if any issues arise.  Unless otherwise stated, the patient is to continue all other medications as previously prescribed.\par \par ANXIETY\par Discussed diagnosis of anxiety and depression with the patient and potential outcomes/side affects of treatment versus non treatment. Medications were assessed and described at length. Side affects and black box warning were discussed.  Patient was advised to continue will all medications prescribed and the need for compliance was discussed and emphasized. Patient was advised to not stop medications without discussing with a health care provider first. Patient was advised to continue psychotherapy or seek therapy if not currently attending. Patient was educated on addictive potential of controlled substances and was counseled to use only as needed and sparingly. Patient verbalized understanding of all the above.\par patient reports that anxiety has been severe since having her baby who is now 16 months old\par tried lexapro, does not like it\par having panic attacks - 1 time a week\par try zoloft\par \par weight gain\par patient at home with baby - notices she is eating all day\par \par hypertension\par preeclampsia - followed by cardio\par

## 2019-08-13 NOTE — HEALTH RISK ASSESSMENT
[Good] : ~his/her~  mood as  good [] : No [Yes] : Yes [No falls in past year] : Patient reported no falls in the past year [0] : 2) Feeling down, depressed, or hopeless: Not at all (0) [[id number]] : 0 [Patient reported PAP Smear was normal] : Patient reported PAP Smear was normal [With Significant Other] : lives with significant other [Employed] : employed [] :  [# Of Children ___] : has [unfilled] children [Fully functional (bathing, dressing, toileting, transferring, walking, feeding)] : Fully functional (bathing, dressing, toileting, transferring, walking, feeding) [Fully functional (using the telephone, shopping, preparing meals, housekeeping, doing laundry, using] : Fully functional and needs no help or supervision to perform IADLs (using the telephone, shopping, preparing meals, housekeeping, doing laundry, using transportation, managing medications and managing finances) [Smoke Detector] : smoke detector [Seat Belt] :  uses seat belt [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

## 2019-08-13 NOTE — PHYSICAL EXAM
[Well Nourished] : well nourished [Clear to Auscultation] : lungs were clear to auscultation bilaterally [Regular Rhythm] : with a regular rhythm [Normal S1, S2] : normal S1 and S2 [Coordination Grossly Intact] : coordination grossly intact [Normal Affect] : the affect was normal [Normal Insight/Judgement] : insight and judgment were intact

## 2019-08-13 NOTE — HISTORY OF PRESENT ILLNESS
[de-identified] : 28 year old female here to establish care and for a full physical examination. The patients complete medical, family and social history was documented and reviewed with the patient.  The patients medications were identified and also reviewed with the patient as well as any allergies to any medications. All active and previous medical problems were identified and discussed with the patient.  Any new problems were documented. Patient is feeling well today.\par

## 2019-09-03 ENCOUNTER — APPOINTMENT (OUTPATIENT)
Dept: FAMILY MEDICINE | Facility: CLINIC | Age: 29
End: 2019-09-03
Payer: MEDICAID

## 2019-09-03 VITALS — DIASTOLIC BLOOD PRESSURE: 80 MMHG | SYSTOLIC BLOOD PRESSURE: 108 MMHG

## 2019-09-03 LAB — CYTOLOGY CVX/VAG DOC THIN PREP: NORMAL

## 2019-09-03 PROCEDURE — 99214 OFFICE O/P EST MOD 30 MIN: CPT

## 2019-09-03 NOTE — HISTORY OF PRESENT ILLNESS
[de-identified] : 28 year old female is here for a followup visit for her anxiety. Patient is here for medication renewals and for blood work discussion. Medications and allergies were reviewed and assessed.  There has been no new medications since the last visit. \par \par

## 2019-09-03 NOTE — ASSESSMENT
[FreeTextEntry1] : ANXIETY\par Discussed diagnosis of anxiety and depression with the patient and potential outcomes/side affects of treatment versus non treatment. Medications were assessed and described at length. Side affects and black box warning were discussed.  Patient was advised to continue will all medications prescribed and the need for compliance was discussed and emphasized. Patient was advised to not stop medications without discussing with a health care provider first. Patient was advised to continue psychotherapy or seek therapy if not currently attending. Patient was educated on addictive potential of controlled substances and was counseled to use only as needed and sparingly. Patient verbalized understanding of all the above.\par patient reports that anxiety has been severe since having her baby who is now 16 months old\par tried lexapro, does not like it\par having panic attacks - 1 time a week\par switched to zoloft 100, feeling better, but still anxious\par will try increasing to 150\par if no improvement, rto\par \par weight gain\par patient at home with baby - notices she is eating all day\par \par hypertension\par preeclampsia - followed by cardio\par bp controlled

## 2019-09-03 NOTE — HEALTH RISK ASSESSMENT
[] : No [Yes] : Yes [No falls in past year] : Patient reported no falls in the past year [0] : 2) Feeling down, depressed, or hopeless: Not at all (0) [[id number]] : 0 [Good] : ~his/her~  mood as  good [Patient reported PAP Smear was normal] : Patient reported PAP Smear was normal [With Significant Other] : lives with significant other [Employed] : employed [] :  [# Of Children ___] : has [unfilled] children [Fully functional (bathing, dressing, toileting, transferring, walking, feeding)] : Fully functional (bathing, dressing, toileting, transferring, walking, feeding) [Fully functional (using the telephone, shopping, preparing meals, housekeeping, doing laundry, using] : Fully functional and needs no help or supervision to perform IADLs (using the telephone, shopping, preparing meals, housekeeping, doing laundry, using transportation, managing medications and managing finances) [Seat Belt] :  uses seat belt [Smoke Detector] : smoke detector [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

## 2019-11-01 ENCOUNTER — APPOINTMENT (OUTPATIENT)
Dept: FAMILY MEDICINE | Facility: CLINIC | Age: 29
End: 2019-11-01
Payer: MEDICAID

## 2019-11-01 VITALS
BODY MASS INDEX: 30.04 KG/M2 | TEMPERATURE: 98.4 F | WEIGHT: 149 LBS | OXYGEN SATURATION: 96 % | HEIGHT: 59 IN | HEART RATE: 99 BPM

## 2019-11-01 DIAGNOSIS — R42 DIZZINESS AND GIDDINESS: ICD-10-CM

## 2019-11-01 DIAGNOSIS — G43.909 MIGRAINE, UNSPECIFIED, NOT INTRACTABLE, W/OUT STATUS MIGRAINOSUS: ICD-10-CM

## 2019-11-01 PROCEDURE — 99214 OFFICE O/P EST MOD 30 MIN: CPT

## 2019-11-01 RX ORDER — METHYLPREDNISOLONE 4 MG/1
4 TABLET ORAL
Qty: 21 | Refills: 0 | Status: COMPLETED | COMMUNITY
Start: 2019-06-24

## 2019-11-01 RX ORDER — AMOXICILLIN 875 MG/1
875 TABLET, FILM COATED ORAL
Qty: 20 | Refills: 0 | Status: COMPLETED | COMMUNITY
Start: 2019-06-24

## 2019-11-01 RX ORDER — ESCITALOPRAM OXALATE 20 MG/1
20 TABLET ORAL
Qty: 30 | Refills: 0 | Status: COMPLETED | COMMUNITY
Start: 2019-06-18

## 2019-11-01 RX ORDER — MECLIZINE HYDROCHLORIDE 25 MG/1
25 TABLET ORAL
Qty: 15 | Refills: 0 | Status: COMPLETED | COMMUNITY
Start: 2019-06-24

## 2019-11-01 RX ORDER — ESCITALOPRAM OXALATE 10 MG/1
10 TABLET ORAL
Qty: 30 | Refills: 0 | Status: COMPLETED | COMMUNITY
Start: 2019-03-05

## 2019-11-01 RX ORDER — NORETHINDRONE 0.35 MG/1
0.35 TABLET ORAL
Qty: 84 | Refills: 0 | Status: COMPLETED | COMMUNITY
Start: 2019-02-05

## 2019-11-01 RX ORDER — POTASSIUM CHLORIDE 1500 MG/1
20 TABLET, EXTENDED RELEASE ORAL
Qty: 90 | Refills: 0 | Status: COMPLETED | COMMUNITY
Start: 2019-09-09

## 2019-11-01 NOTE — REVIEW OF SYSTEMS
[Headache] : headache [Anxiety] : anxiety [Negative] : Heme/Lymph [de-identified] : vertigo [de-identified] : improved

## 2019-11-01 NOTE — HEALTH RISK ASSESSMENT
[] : No [Yes] : Yes [No falls in past year] : Patient reported no falls in the past year [0] : 2) Feeling down, depressed, or hopeless: Not at all (0) [[id number]] : 0 [Good] : ~his/her~  mood as  good [Patient reported PAP Smear was normal] : Patient reported PAP Smear was normal [With Significant Other] : lives with significant other [Employed] : employed [] :  [# Of Children ___] : has [unfilled] children [Fully functional (bathing, dressing, toileting, transferring, walking, feeding)] : Fully functional (bathing, dressing, toileting, transferring, walking, feeding) [Fully functional (using the telephone, shopping, preparing meals, housekeeping, doing laundry, using] : Fully functional and needs no help or supervision to perform IADLs (using the telephone, shopping, preparing meals, housekeeping, doing laundry, using transportation, managing medications and managing finances) [Smoke Detector] : smoke detector [Seat Belt] :  uses seat belt [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

## 2019-11-01 NOTE — HISTORY OF PRESENT ILLNESS
[de-identified] : 28 year old female is here for a followup visit for her anxiety. Patient is here for medication renewals and for blood work discussion. Medications and allergies were reviewed and assessed.  There has been no new medications since the last visit. \par \par

## 2019-11-01 NOTE — PHYSICAL EXAM
[Well Nourished] : well nourished [Clear to Auscultation] : lungs were clear to auscultation bilaterally [Regular Rhythm] : with a regular rhythm [Normal S1, S2] : normal S1 and S2 [Coordination Grossly Intact] : coordination grossly intact [Normal Affect] : the affect was normal [Normal Insight/Judgement] : insight and judgment were intact [de-identified] : fluid in bilateral ear

## 2019-11-01 NOTE — ASSESSMENT
[FreeTextEntry1] : vertigo\par recurrence, meclizine\par \par migraine\par trial of fioricet\par \par fluid in ears\par nasal spray\par \par ANXIETY\par Discussed diagnosis of anxiety and depression with the patient and potential outcomes/side affects of treatment versus non treatment. Medications were assessed and described at length. Side affects and black box warning were discussed.  Patient was advised to continue will all medications prescribed and the need for compliance was discussed and emphasized. Patient was advised to not stop medications without discussing with a health care provider first. Patient was advised to continue psychotherapy or seek therapy if not currently attending. Patient was educated on addictive potential of controlled substances and was counseled to use only as needed and sparingly. Patient verbalized understanding of all the above.\par patient reports that anxiety has been severe since having her baby who is now 16 months old\par tried lexapro, does not like it\par having panic attacks - 1 time a week\par switched to zoloft 100, feeling better, but still anxious\par now on zoloft 150, doing better, panic attack much less\par \par hypertension\par preeclampsia - followed by cardio\par bp controlled

## 2020-01-03 ENCOUNTER — RX RENEWAL (OUTPATIENT)
Age: 30
End: 2020-01-03

## 2020-03-09 ENCOUNTER — APPOINTMENT (OUTPATIENT)
Dept: FAMILY MEDICINE | Facility: CLINIC | Age: 30
End: 2020-03-09
Payer: MEDICAID

## 2020-03-09 VITALS
DIASTOLIC BLOOD PRESSURE: 60 MMHG | SYSTOLIC BLOOD PRESSURE: 110 MMHG | OXYGEN SATURATION: 98 % | TEMPERATURE: 98.6 F | HEART RATE: 113 BPM

## 2020-03-09 DIAGNOSIS — J06.9 ACUTE UPPER RESPIRATORY INFECTION, UNSPECIFIED: ICD-10-CM

## 2020-03-09 PROCEDURE — 99214 OFFICE O/P EST MOD 30 MIN: CPT

## 2020-03-09 RX ORDER — BUTALBITAL, ACETAMINOPHEN AND CAFFEINE 300; 50; 40 MG/1; MG/1; MG/1
50-300-40 CAPSULE ORAL
Qty: 15 | Refills: 0 | Status: DISCONTINUED | COMMUNITY
Start: 2019-11-01 | End: 2020-03-09

## 2020-03-09 RX ORDER — BUTALBITAL, ACETAMINOPHEN AND CAFFEINE 325; 50; 40 MG/1; MG/1; MG/1
50-325-40 TABLET ORAL
Qty: 15 | Refills: 0 | Status: COMPLETED | COMMUNITY
Start: 2019-11-01

## 2020-03-09 RX ORDER — FLUTICASONE PROPIONATE 50 UG/1
50 SPRAY, METERED NASAL TWICE DAILY
Qty: 1 | Refills: 1 | Status: DISCONTINUED | COMMUNITY
Start: 2019-11-01 | End: 2020-03-09

## 2020-03-09 RX ORDER — SUMATRIPTAN 50 MG/1
50 TABLET, FILM COATED ORAL
Qty: 9 | Refills: 0 | Status: DISCONTINUED | COMMUNITY
Start: 2019-11-01 | End: 2020-03-09

## 2020-03-09 RX ORDER — ATENOLOL AND CHLORTHALIDONE 50; 25 MG/1; MG/1
50-25 TABLET ORAL
Refills: 0 | Status: DISCONTINUED | COMMUNITY
End: 2020-03-09

## 2020-03-09 RX ORDER — MECLIZINE HYDROCHLORIDE 25 MG/1
25 TABLET ORAL 3 TIMES DAILY
Qty: 30 | Refills: 0 | Status: DISCONTINUED | COMMUNITY
Start: 2019-11-01 | End: 2020-03-09

## 2020-03-09 RX ORDER — AMOXICILLIN 500 MG/1
500 CAPSULE ORAL
Qty: 30 | Refills: 0 | Status: COMPLETED | COMMUNITY
Start: 2020-02-18

## 2020-03-09 NOTE — REVIEW OF SYSTEMS
[Headache] : headache [Anxiety] : anxiety [Negative] : Heme/Lymph [de-identified] : vertigo [de-identified] : improved

## 2020-03-09 NOTE — ASSESSMENT
[FreeTextEntry1] : ANXIETY\par Discussed diagnosis of anxiety and depression with the patient and potential outcomes/side affects of treatment versus non treatment. Medications were assessed and described at length. Side affects and black box warning were discussed.  Patient was advised to continue will all medications prescribed and the need for compliance was discussed and emphasized. Patient was advised to not stop medications without discussing with a health care provider first. Patient was advised to continue psychotherapy or seek therapy if not currently attending. Patient was educated on addictive potential of controlled substances and was counseled to use only as needed and sparingly. Patient verbalized understanding of all the above.\par patient reports that anxiety has been severe since having her baby who is now 16 months old\par tried lexapro, does not like it\par having panic attacks - 1 time a week\par switched to zoloft 100, feeling better, but still anxious\par now on zoloft 150, doing better, panic attack much less, however just reduced by MFM while pregnant\par \par hypertension\par preeclampsia - followed by cardio\par bp controlled with labeltolol\par \par 20 weeks pregnant, considering cerclage\par \par uri\par Patient was advised to take all medications as prescribed and to finish any antibiotics in their entirety. Patient was told to rest, hydrate and treat symptoms as necessary. Patient was advised to return to this office or go directly to the ER if symptoms do not improve or if any worsening occurs.\par

## 2020-03-09 NOTE — HEALTH RISK ASSESSMENT
[] : No [Yes] : Yes [No falls in past year] : Patient reported no falls in the past year [0] : 2) Feeling down, depressed, or hopeless: Not at all (0) [[id number]] : 0 [Good] : ~his/her~  mood as  good [Patient reported PAP Smear was normal] : Patient reported PAP Smear was normal [With Significant Other] : lives with significant other [Employed] : employed [] :  [# Of Children ___] : has [unfilled] children [Fully functional (bathing, dressing, toileting, transferring, walking, feeding)] : Fully functional (bathing, dressing, toileting, transferring, walking, feeding) [Fully functional (using the telephone, shopping, preparing meals, housekeeping, doing laundry, using] : Fully functional and needs no help or supervision to perform IADLs (using the telephone, shopping, preparing meals, housekeeping, doing laundry, using transportation, managing medications and managing finances) [Smoke Detector] : smoke detector [Seat Belt] :  uses seat belt [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

## 2020-03-09 NOTE — PHYSICAL EXAM
[Well Nourished] : well nourished [Regular Rhythm] : with a regular rhythm [Normal S1, S2] : normal S1 and S2 [Coordination Grossly Intact] : coordination grossly intact [Normal Affect] : the affect was normal [Normal Insight/Judgement] : insight and judgment were intact [de-identified] : fluid in bilateral ear [de-identified] : coarse

## 2020-03-09 NOTE — HISTORY OF PRESENT ILLNESS
[de-identified] : 29 year old female here with complaints of cough, congestion.  son has bronchitis. Patients active medications, allergies and issues were all reviewed with the patient at time of visit.\par \par

## 2020-07-07 NOTE — PATIENT PROFILE OB - PURPOSEFUL PROACTIVE ROUNDING
From: Ira Kapoor  To: Valentin Cee Patient

## 2020-07-09 NOTE — PATIENT PROFILE OB - WEIGHT: TOTAL WEIGHT IN KG
Assumed care of patient at 1900. Pt is A&Ox4, up self with steady gait. Denies pain or nausea. PIV patent and infusing antibiotics per MAR. On room air. Call light within reach, hourly rounding in place.    2

## 2020-07-11 ENCOUNTER — TRANSCRIPTION ENCOUNTER (OUTPATIENT)
Age: 30
End: 2020-07-11

## 2020-10-13 ENCOUNTER — RX RENEWAL (OUTPATIENT)
Age: 30
End: 2020-10-13

## 2020-10-28 NOTE — DISCHARGE NOTE ANTEPARTUM - YES
PROCEDURE NOTE: Lucentis 0.5mg PFS OS. Diagnosis: Neovascular AMD with Active CNV. Anesthesia: Lidocaine 4%. Prep: Betadine Flush. Prior to injection, risks/benefits/alternatives discussed including but not limited to infection, loss of vision or eye, hemorrhage, cataract, glaucoma, retinal tears or detachment. The patient wished to proceed with treatment. Topical anesthesia was induced with Alcaine. Additional anesthesia was achieved using drop(s) or injection checked above. A drop of Povidone-iodine 5% ophthalmic solution was instilled over the injection site and in the inferior fornix. Betadine prep was performed. A single use prefilled syringe of intravitreal Lucentis 0.5mg/0.05ml was used and excess was disposed of as waste. The needle was passed 3.0 mm posterior to the limbus in pseudophakic patients, and 3.5 mm posterior to the limbus in phakic patients. Injection Time 106. Patient tolerated the procedure well. There were no complications. The eye was irrigated with sterile irrigating solution. Post procedure instructions given. The patient was instructed to use Artificial Tears q.i.d. p.r.n for comfort. The patient was instructed to return for re-evaluation in approximately 4-12 weeks depending on his/her condition and was told to call immediately if vision decreases and/or if his/her eye becomes red, painful, and/or light sensitive. The patient was instructed to go to the emergency room or call 911 if unable to reach the doctor within an hour or two of trying or calling. Nestor Puga
Statement Selected

## 2020-11-04 ENCOUNTER — APPOINTMENT (OUTPATIENT)
Dept: FAMILY MEDICINE | Facility: CLINIC | Age: 30
End: 2020-11-04
Payer: MEDICAID

## 2020-11-04 PROCEDURE — 99214 OFFICE O/P EST MOD 30 MIN: CPT | Mod: 95

## 2020-11-04 RX ORDER — CEFUROXIME AXETIL 500 MG/1
500 TABLET ORAL
Qty: 14 | Refills: 0 | Status: DISCONTINUED | COMMUNITY
Start: 2020-03-09 | End: 2020-11-04

## 2020-11-04 NOTE — HISTORY OF PRESENT ILLNESS
[Home] : at home, [unfilled] , at the time of the visit. [Medical Office: ([facility])___] : at the medical office located in  [Verbal consent obtained from patient] : the patient, [unfilled] [de-identified] : 30 year old female is here for a followup visit. Patient is here for medication renewals and for blood work discussion. Medications and allergies were reviewed and assessed.  There has been no new medications since the last visit. Patient is feeling well with no active changes or issues since Her last visit.\par \par

## 2020-11-04 NOTE — REVIEW OF SYSTEMS
[Headache] : headache [Anxiety] : anxiety [Negative] : Heme/Lymph [de-identified] : vertigo [de-identified] : increased anxiety

## 2020-11-04 NOTE — ASSESSMENT
[FreeTextEntry1] : ANXIETY\par Discussed diagnosis of anxiety and depression with the patient and potential outcomes/side affects of treatment versus non treatment. Medications were assessed and described at length. Side affects and black box warning were discussed.  Patient was advised to continue will all medications prescribed and the need for compliance was discussed and emphasized. Patient was advised to not stop medications without discussing with a health care provider first. Patient was advised to continue psychotherapy or seek therapy if not currently attending. Patient was educated on addictive potential of controlled substances and was counseled to use only as needed and sparingly. Patient verbalized understanding of all the above.\par patient reports that anxiety has been severe since having her baby who is now 16 months old\par tried lexapro, does not like it\par having panic attacks - 1 time a week\par switched to zoloft 100, feeling better, still anxious, increased to 150 and did well for  long time.\par now new baby having difficulties with reflux and is always crying and the patient is more anxious/overwhelmed\par discussed options. will try increasing to 200\par if improvement, then stay. if no improvement, decrease to 150 and we can consider buspar or ability\par now on zoloft 150, doing better, panic attack much less, however just reduced by MFM while pregnant\par \par hypertension\par preeclampsia - followed by cardio\par bp controlled with labeltolol\par \par \par

## 2020-11-04 NOTE — PHYSICAL EXAM
[Well Nourished] : well nourished [Regular Rhythm] : with a regular rhythm [Normal S1, S2] : normal S1 and S2 [Coordination Grossly Intact] : coordination grossly intact [Normal Affect] : the affect was normal [Normal Insight/Judgement] : insight and judgment were intact [de-identified] : fluid in bilateral ear [de-identified] : coarse

## 2020-11-04 NOTE — HEALTH RISK ASSESSMENT
[] : No [Yes] : Yes [No falls in past year] : Patient reported no falls in the past year [0] : 2) Feeling down, depressed, or hopeless: Not at all (0) [[id number]] : 0 [Good] : ~his/her~  mood as  good [Patient reported PAP Smear was normal] : Patient reported PAP Smear was normal [With Significant Other] : lives with significant other [Employed] : employed [] :  [# Of Children ___] : has [unfilled] children [Fully functional (bathing, dressing, toileting, transferring, walking, feeding)] : Fully functional (bathing, dressing, toileting, transferring, walking, feeding) [Fully functional (using the telephone, shopping, preparing meals, housekeeping, doing laundry, using] : Fully functional and needs no help or supervision to perform IADLs (using the telephone, shopping, preparing meals, housekeeping, doing laundry, using transportation, managing medications and managing finances) [Smoke Detector] : smoke detector [Seat Belt] :  uses seat belt [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

## 2020-11-19 NOTE — ED ADULT TRIAGE NOTE - NS ED NURSE BANDS TYPE
Called and spoke to pt's . He stated he called [facility] and a lady at the records department informed him that Dr. Boudreaux staff has to fill out a TIMO put pt's name and  what is being requested and fax form back to [phone]    [facility] Staff   Caller: Brendan () (Today, 10:06 AM)             Type:  Patient Returning Call     Who Called: Brendan   Who Left Message for Patient: Ngoc   Does the patient know what this is regarding?: hospital stay test results   Would the patient rather a call back or a response via [facility]?  Call back   Best Call Back Number: [phone]   Additional Information:  requesting blood results from [facility], fax request to [phone] with pt's name and            Name band;

## 2020-12-23 PROBLEM — J06.9 ACUTE URI: Status: RESOLVED | Noted: 2020-03-09 | Resolved: 2020-12-23

## 2020-12-29 ENCOUNTER — APPOINTMENT (OUTPATIENT)
Dept: FAMILY MEDICINE | Facility: CLINIC | Age: 30
End: 2020-12-29
Payer: MEDICAID

## 2020-12-29 DIAGNOSIS — N39.0 URINARY TRACT INFECTION, SITE NOT SPECIFIED: ICD-10-CM

## 2020-12-29 PROCEDURE — 99213 OFFICE O/P EST LOW 20 MIN: CPT | Mod: 95

## 2021-03-16 ENCOUNTER — RX RENEWAL (OUTPATIENT)
Age: 31
End: 2021-03-16

## 2021-05-25 ENCOUNTER — LABORATORY RESULT (OUTPATIENT)
Age: 31
End: 2021-05-25

## 2021-05-25 ENCOUNTER — APPOINTMENT (OUTPATIENT)
Dept: FAMILY MEDICINE | Facility: CLINIC | Age: 31
End: 2021-05-25
Payer: MEDICAID

## 2021-05-25 VITALS
OXYGEN SATURATION: 98 % | WEIGHT: 165 LBS | TEMPERATURE: 98 F | SYSTOLIC BLOOD PRESSURE: 120 MMHG | HEART RATE: 98 BPM | BODY MASS INDEX: 33.26 KG/M2 | DIASTOLIC BLOOD PRESSURE: 78 MMHG | HEIGHT: 59 IN

## 2021-05-25 PROCEDURE — 99395 PREV VISIT EST AGE 18-39: CPT | Mod: 25

## 2021-05-25 RX ORDER — LABETALOL HYDROCHLORIDE 100 MG/1
100 TABLET, FILM COATED ORAL
Qty: 60 | Refills: 0 | Status: DISCONTINUED | COMMUNITY
Start: 2019-11-19 | End: 2021-05-25

## 2021-05-25 RX ORDER — ALBUTEROL SULFATE 90 UG/1
108 (90 BASE) INHALANT RESPIRATORY (INHALATION)
Qty: 1 | Refills: 0 | Status: DISCONTINUED | COMMUNITY
Start: 2020-03-09 | End: 2021-05-25

## 2021-05-25 RX ORDER — CIPROFLOXACIN HYDROCHLORIDE 250 MG/1
250 TABLET, FILM COATED ORAL
Qty: 14 | Refills: 0 | Status: DISCONTINUED | COMMUNITY
Start: 2020-12-29 | End: 2021-05-25

## 2021-05-25 NOTE — ASSESSMENT
[FreeTextEntry1] : Patient was counseled on healthy eating habits, on daily exercise and stress relief. All medications and allergies were reviewed with the patient and any adjustments necessary were made. Patient was counseled to try engage in an exercise activity for at least 30 minutes 3-4 times a week.  Patient was advised to eat a diet low in fat and carbohydrates and high in protein, with plenty of vegetables. Patient was advised to try and engage in relaxing activities whenever possible.\par The patients blood was draw in office and will be followed and assessed for any issues.  Patient was told to return to the office if any issues arise.  Unless otherwise stated, the patient is to continue all other medications as previously prescribed.\par \par ANXIETY\par Discussed diagnosis of anxiety and depression with the patient and potential outcomes/side affects of treatment versus non treatment. Medications were assessed and described at length. Side affects and black box warning were discussed.  Patient was advised to continue will all medications prescribed and the need for compliance was discussed and emphasized. Patient was advised to not stop medications without discussing with a health care provider first. Patient was advised to continue psychotherapy or seek therapy if not currently attending. Patient was educated on addictive potential of controlled substances and was counseled to use only as needed and sparingly. Patient verbalized understanding of all the above.\par patient reports that anxiety has been severe since having her baby who is now 16 months old\par tried lexapro, does not like it\par having panic attacks - 1 time a week\par switched to zoloft 100, feeling better, still anxious, increased to 150 and did well for  long time, now on 200 and does not feel like it helping her anxiety at all\par \par will try adding buspar bid\par \par hypertension\par now off medications, doing well\par \par

## 2021-05-25 NOTE — HEALTH RISK ASSESSMENT
[Good] : ~his/her~  mood as  good [] : No [Yes] : Yes [No falls in past year] : Patient reported no falls in the past year [0] : 2) Feeling down, depressed, or hopeless: Not at all (0) [DPT1Xgncp] : 0 [Patient reported PAP Smear was normal] : Patient reported PAP Smear was normal [With Significant Other] : lives with significant other [Employed] : employed [] :  [# Of Children ___] : has [unfilled] children [Fully functional (bathing, dressing, toileting, transferring, walking, feeding)] : Fully functional (bathing, dressing, toileting, transferring, walking, feeding) [Fully functional (using the telephone, shopping, preparing meals, housekeeping, doing laundry, using] : Fully functional and needs no help or supervision to perform IADLs (using the telephone, shopping, preparing meals, housekeeping, doing laundry, using transportation, managing medications and managing finances) [Smoke Detector] : smoke detector [Seat Belt] :  uses seat belt [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

## 2021-05-25 NOTE — REVIEW OF SYSTEMS
[Headache] : headache [Anxiety] : anxiety [Negative] : Heme/Lymph [de-identified] : vertigo [de-identified] : increased anxiety

## 2021-05-25 NOTE — HISTORY OF PRESENT ILLNESS
[de-identified] : 30 year old female  here for annual well visit. Patient's blood work was drawn and medications reviewed. Patient's past medical history was reviewed, allergies verified and problems were identified and assessed. Patients medications were reviewed.\par \par patient overwhelmed, anxious

## 2021-05-26 ENCOUNTER — TRANSCRIPTION ENCOUNTER (OUTPATIENT)
Age: 31
End: 2021-05-26

## 2021-05-26 LAB
25(OH)D3 SERPL-MCNC: 50.2 NG/ML
ALBUMIN SERPL ELPH-MCNC: 4.9 G/DL
ALP BLD-CCNC: 100 U/L
ALT SERPL-CCNC: 20 U/L
ANION GAP SERPL CALC-SCNC: 13 MMOL/L
APPEARANCE: ABNORMAL
AST SERPL-CCNC: 21 U/L
BASOPHILS # BLD AUTO: 0.02 K/UL
BASOPHILS NFR BLD AUTO: 0.3 %
BILIRUB SERPL-MCNC: 0.3 MG/DL
BILIRUBIN URINE: NEGATIVE
BLOOD URINE: NEGATIVE
BUN SERPL-MCNC: 13 MG/DL
CALCIUM SERPL-MCNC: 9.8 MG/DL
CHLORIDE SERPL-SCNC: 101 MMOL/L
CHOLEST SERPL-MCNC: 190 MG/DL
CO2 SERPL-SCNC: 27 MMOL/L
COLOR: YELLOW
CREAT SERPL-MCNC: 0.71 MG/DL
EOSINOPHIL # BLD AUTO: 0.07 K/UL
EOSINOPHIL NFR BLD AUTO: 1.2 %
ESTIMATED AVERAGE GLUCOSE: 103 MG/DL
GLUCOSE QUALITATIVE U: NEGATIVE
GLUCOSE SERPL-MCNC: 80 MG/DL
HBA1C MFR BLD HPLC: 5.2 %
HCT VFR BLD CALC: 48 %
HDLC SERPL-MCNC: 47 MG/DL
HGB BLD-MCNC: 14.9 G/DL
IMM GRANULOCYTES NFR BLD AUTO: 0.2 %
KETONES URINE: NEGATIVE
LDLC SERPL CALC-MCNC: 84 MG/DL
LEUKOCYTE ESTERASE URINE: ABNORMAL
LYMPHOCYTES # BLD AUTO: 1.59 K/UL
LYMPHOCYTES NFR BLD AUTO: 26.7 %
MAN DIFF?: NORMAL
MCHC RBC-ENTMCNC: 26.5 PG
MCHC RBC-ENTMCNC: 31 GM/DL
MCV RBC AUTO: 85.4 FL
MONOCYTES # BLD AUTO: 0.53 K/UL
MONOCYTES NFR BLD AUTO: 8.9 %
NEUTROPHILS # BLD AUTO: 3.73 K/UL
NEUTROPHILS NFR BLD AUTO: 62.7 %
NITRITE URINE: NEGATIVE
NONHDLC SERPL-MCNC: 143 MG/DL
PH URINE: 6.5
PLATELET # BLD AUTO: 324 K/UL
POTASSIUM SERPL-SCNC: 4.1 MMOL/L
PROT SERPL-MCNC: 7.1 G/DL
PROTEIN URINE: NEGATIVE
RBC # BLD: 5.62 M/UL
RBC # FLD: 15 %
SODIUM SERPL-SCNC: 140 MMOL/L
SPECIFIC GRAVITY URINE: 1.01
TRIGL SERPL-MCNC: 296 MG/DL
TSH SERPL-ACNC: 2.78 UIU/ML
UROBILINOGEN URINE: NORMAL
WBC # FLD AUTO: 5.95 K/UL

## 2021-06-18 ENCOUNTER — APPOINTMENT (OUTPATIENT)
Dept: FAMILY MEDICINE | Facility: CLINIC | Age: 31
End: 2021-06-18
Payer: MEDICAID

## 2021-06-18 PROCEDURE — 99442: CPT

## 2021-06-18 RX ORDER — BUSPIRONE HYDROCHLORIDE 30 MG/1
30 TABLET ORAL TWICE DAILY
Qty: 60 | Refills: 0 | Status: DISCONTINUED | COMMUNITY
Start: 2021-05-25 | End: 2021-06-18

## 2021-06-18 NOTE — ASSESSMENT
[FreeTextEntry1] : \par \par ANXIETY\par Discussed diagnosis of anxiety and depression with the patient and potential outcomes/side affects of treatment versus non treatment. Medications were assessed and described at length. Side affects and black box warning were discussed.  Patient was advised to continue will all medications prescribed and the need for compliance was discussed and emphasized. Patient was advised to not stop medications without discussing with a health care provider first. Patient was advised to continue psychotherapy or seek therapy if not currently attending. Patient was educated on addictive potential of controlled substances and was counseled to use only as needed and sparingly. Patient verbalized understanding of all the above.\par patient reports that anxiety has been severe since having her baby who is now 16 months old\par tried lexapro, does not like it\par having panic attacks - 1 time a week\par switched to zoloft 100, feeling better, still anxious, increased to 150 and did well for  long time, now on 200 and does not feel like it helping her anxiety at all - total has been on zoloft since approximtely 2018\par \par tried adding buspar, did not help, \par will try adding abilify\par can consider switching ssri if no improvement\par \par 12 min\par \par \par hypertension\par now off medications, doing well\par \par

## 2021-06-18 NOTE — REVIEW OF SYSTEMS
[Headache] : headache [Anxiety] : anxiety [Negative] : Heme/Lymph [de-identified] : vertigo [de-identified] : increased anxiety

## 2021-06-18 NOTE — HEALTH RISK ASSESSMENT
[Yes] : Yes [No falls in past year] : Patient reported no falls in the past year [0] : 2) Feeling down, depressed, or hopeless: Not at all (0) [Good] : ~his/her~  mood as  good [Patient reported PAP Smear was normal] : Patient reported PAP Smear was normal [With Significant Other] : lives with significant other [Employed] : employed [] :  [# Of Children ___] : has [unfilled] children [Fully functional (bathing, dressing, toileting, transferring, walking, feeding)] : Fully functional (bathing, dressing, toileting, transferring, walking, feeding) [Fully functional (using the telephone, shopping, preparing meals, housekeeping, doing laundry, using] : Fully functional and needs no help or supervision to perform IADLs (using the telephone, shopping, preparing meals, housekeeping, doing laundry, using transportation, managing medications and managing finances) [Smoke Detector] : smoke detector [Seat Belt] :  uses seat belt [] : No [[id number]] : 0 [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

## 2021-06-18 NOTE — HISTORY OF PRESENT ILLNESS
[de-identified] : 30 year old female is here for a followup visit for mood, anxious, telephone visit - consent given\par \par patient overwhelmed, anxious, added buspar, did not like it\par

## 2021-06-25 ENCOUNTER — RX RENEWAL (OUTPATIENT)
Age: 31
End: 2021-06-25

## 2021-07-16 ENCOUNTER — APPOINTMENT (OUTPATIENT)
Dept: FAMILY MEDICINE | Facility: CLINIC | Age: 31
End: 2021-07-16
Payer: MEDICAID

## 2021-07-16 PROCEDURE — 99213 OFFICE O/P EST LOW 20 MIN: CPT | Mod: 95

## 2021-07-16 NOTE — ASSESSMENT
[FreeTextEntry1] : acute sinusitis\par Patient was advised to take all medications as prescribed and to finish any antibiotics in their entirety. Patient was told to rest, hydrate and treat symptoms as necessary. Patient was advised to return to this office or go directly to the ER if symptoms do not improve or if any worsening occurs.\par \par ANXIETY\par Discussed diagnosis of anxiety and depression with the patient and potential outcomes/side affects of treatment versus non treatment. Medications were assessed and described at length. Side affects and black box warning were discussed.  Patient was advised to continue will all medications prescribed and the need for compliance was discussed and emphasized. Patient was advised to not stop medications without discussing with a health care provider first. Patient was advised to continue psychotherapy or seek therapy if not currently attending. Patient was educated on addictive potential of controlled substances and was counseled to use only as needed and sparingly. Patient verbalized understanding of all the above.\par patient reports that anxiety has been severe since having her baby who is now 16 months old\par tried lexapro, does not like it\par having panic attacks - 1 time a week\par switched to zoloft 100, feeling better, still anxious, increased to 150 and did well for  long time, now on 200 and does not feel like it helping her anxiety at all - total has been on zoloft since approximately 2018\par \par tried adding buspar, did not help, \par will try adding abilify\par can consider switching ssri if no improvement\par \par hypertension\par now off medications, doing well\par \par

## 2021-07-16 NOTE — HEALTH RISK ASSESSMENT
[] : No [Yes] : Yes [No falls in past year] : Patient reported no falls in the past year [0] : 2) Feeling down, depressed, or hopeless: Not at all (0) [[id number]] : 0 [Good] : ~his/her~  mood as  good [Patient reported PAP Smear was normal] : Patient reported PAP Smear was normal [With Significant Other] : lives with significant other [Employed] : employed [] :  [# Of Children ___] : has [unfilled] children [Fully functional (bathing, dressing, toileting, transferring, walking, feeding)] : Fully functional (bathing, dressing, toileting, transferring, walking, feeding) [Fully functional (using the telephone, shopping, preparing meals, housekeeping, doing laundry, using] : Fully functional and needs no help or supervision to perform IADLs (using the telephone, shopping, preparing meals, housekeeping, doing laundry, using transportation, managing medications and managing finances) [Smoke Detector] : smoke detector [Seat Belt] :  uses seat belt [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

## 2021-07-16 NOTE — REVIEW OF SYSTEMS
[Headache] : headache [Anxiety] : anxiety [Negative] : Heme/Lymph [FreeTextEntry4] : sinus pressure and pain [de-identified] : vertigo [de-identified] : increased anxiety

## 2021-07-19 ENCOUNTER — APPOINTMENT (OUTPATIENT)
Dept: FAMILY MEDICINE | Facility: CLINIC | Age: 31
End: 2021-07-19

## 2021-07-27 ENCOUNTER — RX RENEWAL (OUTPATIENT)
Age: 31
End: 2021-07-27

## 2021-08-24 ENCOUNTER — TRANSCRIPTION ENCOUNTER (OUTPATIENT)
Age: 31
End: 2021-08-24

## 2021-08-25 ENCOUNTER — TRANSCRIPTION ENCOUNTER (OUTPATIENT)
Age: 31
End: 2021-08-25

## 2021-09-08 ENCOUNTER — APPOINTMENT (OUTPATIENT)
Dept: FAMILY MEDICINE | Facility: CLINIC | Age: 31
End: 2021-09-08
Payer: MEDICAID

## 2021-09-08 PROCEDURE — 99214 OFFICE O/P EST MOD 30 MIN: CPT | Mod: 95

## 2021-09-08 RX ORDER — AMOXICILLIN AND CLAVULANATE POTASSIUM 875; 125 MG/1; MG/1
875-125 TABLET, COATED ORAL
Qty: 14 | Refills: 0 | Status: DISCONTINUED | COMMUNITY
Start: 2021-07-16 | End: 2021-09-08

## 2021-09-08 RX ORDER — ARIPIPRAZOLE 2 MG/1
2 TABLET ORAL
Qty: 30 | Refills: 2 | Status: DISCONTINUED | COMMUNITY
Start: 2021-06-18 | End: 2021-09-08

## 2021-09-08 RX ORDER — METHYLPREDNISOLONE 4 MG/1
4 TABLET ORAL
Qty: 21 | Refills: 0 | Status: DISCONTINUED | COMMUNITY
Start: 2021-07-16 | End: 2021-09-08

## 2021-09-08 NOTE — REVIEW OF SYSTEMS
[Headache] : headache [Anxiety] : anxiety [Negative] : Heme/Lymph [de-identified] : vertigo [de-identified] : increased anxiety

## 2021-09-08 NOTE — ASSESSMENT
[FreeTextEntry1] : \par \par ANXIETY\par Discussed diagnosis of anxiety and depression with the patient and potential outcomes/side affects of treatment versus non treatment. Medications were assessed and described at length. Side affects and black box warning were discussed.  Patient was advised to continue will all medications prescribed and the need for compliance was discussed and emphasized. Patient was advised to not stop medications without discussing with a health care provider first. Patient was advised to continue psychotherapy or seek therapy if not currently attending. Patient was educated on addictive potential of controlled substances and was counseled to use only as needed and sparingly. Patient verbalized understanding of all the above.\par patient reports that anxiety has been severe since having her baby who is now 16 months old\par tried lexapro, does not like it, stopped working\par having panic attacks - 1 time a week\par switched to zoloft 100, feeling better, still anxious, increased to 150 and did well for  long time, now on 200 and does not feel like it helping her anxiety at all - total has been on zoloft since approximtely 2018\par \par tried adding buspar, did not help, tried abilfy which made her more anxious\par \par will try switching class to effexor and reassess\par used to use xanax successfully in the past, will give as needed, reviewed addiction potential\par \par \par hypertension\par now off medications, doing well\par \par

## 2021-10-19 ENCOUNTER — RX RENEWAL (OUTPATIENT)
Age: 31
End: 2021-10-19

## 2021-11-24 ENCOUNTER — APPOINTMENT (OUTPATIENT)
Dept: FAMILY MEDICINE | Facility: CLINIC | Age: 31
End: 2021-11-24
Payer: MEDICAID

## 2021-11-24 DIAGNOSIS — F41.0 PANIC DISORDER [EPISODIC PAROXYSMAL ANXIETY]: ICD-10-CM

## 2021-11-24 DIAGNOSIS — I10 ESSENTIAL (PRIMARY) HYPERTENSION: ICD-10-CM

## 2021-11-24 PROCEDURE — 99214 OFFICE O/P EST MOD 30 MIN: CPT | Mod: 95

## 2021-11-24 RX ORDER — SERTRALINE HYDROCHLORIDE 100 MG/1
100 TABLET, FILM COATED ORAL
Qty: 60 | Refills: 0 | Status: DISCONTINUED | COMMUNITY
Start: 2019-08-13 | End: 2021-11-24

## 2021-11-24 NOTE — ASSESSMENT
[FreeTextEntry1] : \par \par ANXIETY\par Discussed diagnosis of anxiety and depression with the patient and potential outcomes/side affects of treatment versus non treatment. Medications were assessed and described at length. Side affects and black box warning were discussed.  Patient was advised to continue will all medications prescribed and the need for compliance was discussed and emphasized. Patient was advised to not stop medications without discussing with a health care provider first. Patient was advised to continue psychotherapy or seek therapy if not currently attending. Patient was educated on addictive potential of controlled substances and was counseled to use only as needed and sparingly. Patient verbalized understanding of all the above.\par patient reports that anxiety has been severe since having her baby who is now 16 months old\par tried lexapro, does not like it, stopped working\par having panic attacks - 1 time a week\par switched to zoloft 100, feeling better, still anxious, increased to 150 and did well for  long time, now on 200 and does not feel like it helping her anxiety at all - total has been on zoloft since approximately 2018\par \par tried adding buspar, did not help, tried abilfy which made her more anxious\par \par switched to effexor 75 in september 2021, feeling better, less anxiety more relaxed, never had depression, handling life better, but would like increase, will add 37.5\par \par used to use xanax successfully in the past, will give as needed, reviewed addiction potential\par only using xanax tiw prn\par \par hypertension\par now off medications, doing well\par \par

## 2021-11-24 NOTE — HISTORY OF PRESENT ILLNESS
[de-identified] : 31 year old female is here for a followup visit for mood, anxious, t consent given\par \par patient overwhelmed, anxious, added buspar, did not like it, did not like abilify\par  [Home] : at home, [unfilled] , at the time of the visit. [Medical Office: ([facility])___] : at the medical office located in  [Verbal consent obtained from patient] : the patient, [unfilled]

## 2021-11-24 NOTE — HEALTH RISK ASSESSMENT
[] : No [Yes] : Yes [No falls in past year] : Patient reported no falls in the past year [0] : 2) Feeling down, depressed, or hopeless: Not at all (0) [[id number]] : 0 [Good] : ~his/her~  mood as  good [Patient reported PAP Smear was normal] : Patient reported PAP Smear was normal [With Significant Other] : lives with significant other [Employed] : employed [] :  [# Of Children ___] : has [unfilled] children [Fully functional (bathing, dressing, toileting, transferring, walking, feeding)] : Fully functional (bathing, dressing, toileting, transferring, walking, feeding) [Fully functional (using the telephone, shopping, preparing meals, housekeeping, doing laundry, using] : Fully functional and needs no help or supervision to perform IADLs (using the telephone, shopping, preparing meals, housekeeping, doing laundry, using transportation, managing medications and managing finances) [Smoke Detector] : smoke detector [Seat Belt] :  uses seat belt [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

## 2021-11-24 NOTE — REVIEW OF SYSTEMS
[Headache] : headache [Anxiety] : anxiety [Negative] : Heme/Lymph [de-identified] : vertigo [de-identified] : increased anxiety

## 2021-12-20 ENCOUNTER — RX RENEWAL (OUTPATIENT)
Age: 31
End: 2021-12-20

## 2022-01-11 ENCOUNTER — APPOINTMENT (OUTPATIENT)
Dept: FAMILY MEDICINE | Facility: CLINIC | Age: 32
End: 2022-01-11
Payer: MEDICAID

## 2022-01-11 DIAGNOSIS — J01.90 ACUTE SINUSITIS, UNSPECIFIED: ICD-10-CM

## 2022-01-11 PROCEDURE — 99214 OFFICE O/P EST MOD 30 MIN: CPT | Mod: 95

## 2022-01-11 RX ORDER — VENLAFAXINE HYDROCHLORIDE 75 MG/1
75 CAPSULE, EXTENDED RELEASE ORAL DAILY
Qty: 90 | Refills: 1 | Status: DISCONTINUED | COMMUNITY
Start: 2021-09-08 | End: 2022-01-11

## 2022-01-11 RX ORDER — VENLAFAXINE HYDROCHLORIDE 37.5 MG/1
37.5 CAPSULE, EXTENDED RELEASE ORAL DAILY
Qty: 90 | Refills: 1 | Status: DISCONTINUED | COMMUNITY
Start: 2021-11-24 | End: 2022-01-11

## 2022-01-11 NOTE — ASSESSMENT
[FreeTextEntry1] : acute sinsitis\par Patient was advised to take all medications as prescribed and to finish any antibiotics in their entirety. Patient was told to rest, hydrate and treat symptoms as necessary. Patient was advised to return to this office or go directly to the ER if symptoms do not improve or if any worsening occurs.\par \par \par ANXIETY\par Discussed diagnosis of anxiety and depression with the patient and potential outcomes/side affects of treatment versus non treatment. Medications were assessed and described at length. Side affects and black box warning were discussed.  Patient was advised to continue will all medications prescribed and the need for compliance was discussed and emphasized. Patient was advised to not stop medications without discussing with a health care provider first. Patient was advised to continue psychotherapy or seek therapy if not currently attending. Patient was educated on addictive potential of controlled substances and was counseled to use only as needed and sparingly. Patient verbalized understanding of all the above.\par patient reports that anxiety has been severe since having her baby who is now 16 months old\par tried lexapro, does not like it, stopped working\par having panic attacks - 1 time a week\par switched to zoloft 100, feeling better, still anxious, increased to 150 and did well for  long time, now on 200 and does not feel like it helping her anxiety at all - total has been on zoloft since approximately 2018\par \par tried adding buspar, did not help, tried abilfy which made her more anxious\par \par switched to effexor 75 in september 2021, feeling better, less anxiety more relaxed, never had depression, \par 1/11/22 - patient bp is increasing, would like to switch back to zoloft with occasional xanax as needed\par reviewed weaning protocol, zoloft 50 mg qhs for 4 days, effexor 75, then reduce effexor 37.5 in morning, 100 mg zoloft at bedtime, then day 11, as long as she is feeling well, reduce to every other day zoloft 150, week three zoloft 150\par \par \par used to use xanax successfully in the past, will give as needed, reviewed addiction potential\par only using xanax tiw prn\par \par hypertension\par now off medications, doing well\par \par

## 2022-01-11 NOTE — HEALTH RISK ASSESSMENT
[Yes] : Yes [No falls in past year] : Patient reported no falls in the past year [0] : 2) Feeling down, depressed, or hopeless: Not at all (0) [[id number]] : 0 [Good] : ~his/her~  mood as  good [Patient reported PAP Smear was normal] : Patient reported PAP Smear was normal [With Significant Other] : lives with significant other [Employed] : employed [] :  [# Of Children ___] : has [unfilled] children [Fully functional (bathing, dressing, toileting, transferring, walking, feeding)] : Fully functional (bathing, dressing, toileting, transferring, walking, feeding) [Fully functional (using the telephone, shopping, preparing meals, housekeeping, doing laundry, using] : Fully functional and needs no help or supervision to perform IADLs (using the telephone, shopping, preparing meals, housekeeping, doing laundry, using transportation, managing medications and managing finances) [Smoke Detector] : smoke detector [Seat Belt] :  uses seat belt [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

## 2022-01-11 NOTE — REVIEW OF SYSTEMS
[Fatigue] : fatigue [Cough] : cough [Headache] : headache [Anxiety] : anxiety [Negative] : Heme/Lymph [de-identified] : vertigo [de-identified] : increased anxiety

## 2022-03-19 NOTE — DISCHARGE NOTE OB - CHANGE SANITARY PADS FREQUENTLY.  WASHING AND WIPING SHOULD OCCUR FROM FRONT TO BACK
Statement Selected
This was a shared visit with the ACE. I reviewed and verified the documentation and independently performed the documented:

## 2022-07-03 ENCOUNTER — RX RENEWAL (OUTPATIENT)
Age: 32
End: 2022-07-03

## 2022-08-17 ENCOUNTER — RX RENEWAL (OUTPATIENT)
Age: 32
End: 2022-08-17

## 2022-08-26 ENCOUNTER — NON-APPOINTMENT (OUTPATIENT)
Age: 32
End: 2022-08-26

## 2022-08-30 ENCOUNTER — NON-APPOINTMENT (OUTPATIENT)
Age: 32
End: 2022-08-30

## 2022-09-01 ENCOUNTER — TRANSCRIPTION ENCOUNTER (OUTPATIENT)
Age: 32
End: 2022-09-01

## 2022-09-07 ENCOUNTER — TRANSCRIPTION ENCOUNTER (OUTPATIENT)
Age: 32
End: 2022-09-07

## 2022-09-20 ENCOUNTER — NON-APPOINTMENT (OUTPATIENT)
Age: 32
End: 2022-09-20

## 2022-10-17 ENCOUNTER — APPOINTMENT (OUTPATIENT)
Dept: FAMILY MEDICINE | Facility: CLINIC | Age: 32
End: 2022-10-17

## 2022-10-17 VITALS
DIASTOLIC BLOOD PRESSURE: 80 MMHG | TEMPERATURE: 97.9 F | HEART RATE: 79 BPM | BODY MASS INDEX: 32.25 KG/M2 | HEIGHT: 59 IN | SYSTOLIC BLOOD PRESSURE: 122 MMHG | OXYGEN SATURATION: 97 % | WEIGHT: 160 LBS

## 2022-10-17 DIAGNOSIS — Z82.49 FAMILY HISTORY OF ISCHEMIC HEART DISEASE AND OTHER DISEASES OF THE CIRCULATORY SYSTEM: ICD-10-CM

## 2022-10-17 DIAGNOSIS — R05.8 OTHER SPECIFIED COUGH: ICD-10-CM

## 2022-10-17 PROCEDURE — 36415 COLL VENOUS BLD VENIPUNCTURE: CPT

## 2022-10-17 PROCEDURE — 99395 PREV VISIT EST AGE 18-39: CPT | Mod: 25

## 2022-10-17 RX ORDER — AMOXICILLIN AND CLAVULANATE POTASSIUM 875; 125 MG/1; MG/1
875-125 TABLET, COATED ORAL
Qty: 14 | Refills: 0 | Status: DISCONTINUED | COMMUNITY
Start: 2022-01-11 | End: 2022-10-17

## 2022-10-17 NOTE — HEALTH RISK ASSESSMENT
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

## 2022-10-17 NOTE — REVIEW OF SYSTEMS
[Anxiety] : anxiety [Negative] : Genitourinary [Fever] : no fever [Chills] : no chills [Earache] : no earache [Sore Throat] : no sore throat [Postnasal Drip] : no postnasal drip [Chest Pain] : no chest pain [Palpitations] : no palpitations [Shortness Of Breath] : no shortness of breath [Wheezing] : no wheezing [Headache] : no headache [Dizziness] : no dizziness [Depression] : no depression [FreeTextEntry6] : cough on and off [de-identified] : 5-6 hours of sleep per night

## 2022-10-17 NOTE — PHYSICAL EXAM
[PERRL] : pupils equal round and reactive to light [Normal Oropharynx] : the oropharynx was normal [Normal TMs] : both tympanic membranes were normal [No Lymphadenopathy] : no lymphadenopathy [No Edema] : there was no peripheral edema [Normal] : affect was normal and insight and judgment were intact

## 2022-10-17 NOTE — PLAN
[FreeTextEntry1] : ate 8 hours ago. \par Will follow up labwork drawn in office today.\par \par Dry cough-start Flonase consistently.  Can add claritin.\par \par Anxiety-doing well on current regimen, Xanax for breakthrough.\par I-STOP checked. Precautions discussed, need for routine follow-up in office every 3 months.\par \par Will adjust meds based on labs. \par Patient expressed understanding of plan.\par

## 2022-10-17 NOTE — PAST MEDICAL HISTORY
[Menstruating] : menstruating [Approximately ___] : the LMP was approximately [unfilled] [Regular Cycle Intervals] : have been regular [FreeTextEntry1] : 5 days in length-heavy sometimes

## 2022-10-17 NOTE — HISTORY OF PRESENT ILLNESS
[FreeTextEntry1] : Ms. CHAMBERS presents for annual physical.\par  [de-identified] : Ms. CHAMBERS presents for annual physical.  Hx of pre-eclampsia, history of anemia. \par Currently taking iron supplement.  \par \par Has 4 and 2 year old boys.  \par Anxiety-manageable at this point.  Takes Xanax rarely. \par Saw Gyn Spring 2022-reports pap smear Normal. \par \par Has had on and off cough; seen at  twice this fall, kids have been sick. \par Declined flu vaccine

## 2022-10-19 LAB
ALBUMIN SERPL ELPH-MCNC: 4.9 G/DL
ALP BLD-CCNC: 97 U/L
ALT SERPL-CCNC: 26 U/L
ANION GAP SERPL CALC-SCNC: 14 MMOL/L
AST SERPL-CCNC: 19 U/L
BASOPHILS # BLD AUTO: 0.02 K/UL
BASOPHILS NFR BLD AUTO: 0.3 %
BILIRUB SERPL-MCNC: 0.2 MG/DL
BUN SERPL-MCNC: 11 MG/DL
CALCIUM SERPL-MCNC: 10.6 MG/DL
CHLORIDE SERPL-SCNC: 101 MMOL/L
CHOLEST SERPL-MCNC: 219 MG/DL
CO2 SERPL-SCNC: 28 MMOL/L
CREAT SERPL-MCNC: 0.73 MG/DL
EGFR: 111 ML/MIN/1.73M2
EOSINOPHIL # BLD AUTO: 0.15 K/UL
EOSINOPHIL NFR BLD AUTO: 2.2 %
ESTIMATED AVERAGE GLUCOSE: 108 MG/DL
GLUCOSE SERPL-MCNC: 80 MG/DL
HBA1C MFR BLD HPLC: 5.4 %
HCT VFR BLD CALC: 44 %
HDLC SERPL-MCNC: 48 MG/DL
HGB BLD-MCNC: 14.3 G/DL
IMM GRANULOCYTES NFR BLD AUTO: 0.3 %
LDLC SERPL CALC-MCNC: 92 MG/DL
LYMPHOCYTES # BLD AUTO: 2.07 K/UL
LYMPHOCYTES NFR BLD AUTO: 30.9 %
MAN DIFF?: NORMAL
MCHC RBC-ENTMCNC: 28.1 PG
MCHC RBC-ENTMCNC: 32.5 GM/DL
MCV RBC AUTO: 86.4 FL
MONOCYTES # BLD AUTO: 0.51 K/UL
MONOCYTES NFR BLD AUTO: 7.6 %
NEUTROPHILS # BLD AUTO: 3.92 K/UL
NEUTROPHILS NFR BLD AUTO: 58.7 %
NONHDLC SERPL-MCNC: 171 MG/DL
PLATELET # BLD AUTO: 313 K/UL
POTASSIUM SERPL-SCNC: 5 MMOL/L
PROT SERPL-MCNC: 7 G/DL
RBC # BLD: 5.09 M/UL
RBC # FLD: 14.5 %
SODIUM SERPL-SCNC: 143 MMOL/L
TRIGL SERPL-MCNC: 394 MG/DL
TSH SERPL-ACNC: 1.68 UIU/ML
WBC # FLD AUTO: 6.69 K/UL

## 2022-11-25 ENCOUNTER — NON-APPOINTMENT (OUTPATIENT)
Age: 32
End: 2022-11-25

## 2022-12-11 ENCOUNTER — NON-APPOINTMENT (OUTPATIENT)
Age: 32
End: 2022-12-11

## 2022-12-30 ENCOUNTER — NON-APPOINTMENT (OUTPATIENT)
Age: 32
End: 2022-12-30

## 2023-01-18 NOTE — PATIENT PROFILE OB - WEIGHT PRE-PREGNANCY IN [facility]
Anticoagulation Progress Note    Indication:CVA  Goal INR: 2.0-3.0  Duration:long term  Referring Provider: Dr. Yousif  Supervising Provider: Dr. Dominique Saba Expiration Date: 03/29/2023  Pertinent History: CVA, Dementia    Assessment  Yes No   []  [x] Missed doses:     []  [x] Extra doses:   []  [x] Significant medication changes (RX, OTC, Herbal):   []  [x] High-risk maintenance medications: ASA               []  [x] Vitamin K / dietary changes (Vitamin K goal: avoids cups/week):   []  [x] Bleeding / bruising:   []  [x] Falls / injury:   []  [x] Acute illness:    []  [x] Alcohol intake:   []  [x] Procedures / hospitalization / ER visits:   []  [x] Other:      Plan   ( 5 mg tablets)  INR Result: 2.5  The INR result for today is therapeutic based on the INR goal 2.0-3.0.  Etiology:   Recommended Dose: Continue 7.5mg Tues, Sat, 10mg ROW (65mg/ weekly)  Follow-Up: 3 weeks 02/08/23    Comments: Patient and wife was instructed to contact the clinic with any unusual bleeding or bruising, signs or symptoms of stroke, any changes in medications, diet, health status, lifestyle, or any other changes, questions or concerns. Patient and wife verbalized understanding of all discussed.    Counseling  Counseled about signs/symptoms of bruising/bleeding and appropriate management  Counseled about prevention and management of nosebleeds  Counseled about signs/symptoms of thrombosis and/or stroke and when to seek emergent care  Stressed importance of compliance with exact recommended dosage regimen  Stressed importance of compliance with consistent vitamin K intake  Discussed measures to reduce risk for falls and appropriate action when serious injury occurs  Strongly advised to limit/avoid alcohol consumption  Stressed importance of adherence with recommended lab monitoring  Instructed to notify clinic about medication changes or health status changes  Instructed to notify clinic about scheduled procedures  Discussed risk of  thrombosis and/or bleeding with inappropriate anticoagulant use and monitoring frequency    Provided patient/caregiver with written and verbal dosing instructions, patient/caregiver verbalized understanding.   56

## 2023-02-26 ENCOUNTER — TRANSCRIPTION ENCOUNTER (OUTPATIENT)
Age: 33
End: 2023-02-26

## 2023-03-23 ENCOUNTER — NON-APPOINTMENT (OUTPATIENT)
Age: 33
End: 2023-03-23

## 2023-04-05 ENCOUNTER — RX RENEWAL (OUTPATIENT)
Age: 33
End: 2023-04-05

## 2023-04-25 ENCOUNTER — RX RENEWAL (OUTPATIENT)
Age: 33
End: 2023-04-25

## 2023-05-23 ENCOUNTER — RX RENEWAL (OUTPATIENT)
Age: 33
End: 2023-05-23

## 2023-05-24 NOTE — DISCHARGE NOTE OB - AVOID SEXUAL ACTIVITY UNTIL YOUR POSTPARTUM VISIT
Scheduled procedure with Patient at      Telephone Information:   Mobile [phone]      Scheduled Via: Case Request Order for  84[facility]  Did patient request a different provider then the referred to:No  Procedure date: 7/05/2023   Procedure time: TBD ; Did patient request this specific time? No    -If a MAC pt is scheduled, pt was notified a family member/friend is need to stay with the patient over night after their procedure: Yes              Notified patient about receiving an animated Pattie program? Yes    Was letter mailed Yes  Was the letter sent thru [employer]? Yes. If yes was the patient to to look under letters for prep instructions?  Yes      The following have been confirmed:  Insurance name confirmed as [employer], will be the same at time of procedure?: Yes Ins Accepted at Facility? Yes  Latex Allergy No  Diabetic No  Sleep Apnea UNSURE if yes CPAP  No  Diuretic/Water pill No  Defibrillator/Pacemaker No  MRSA hx No  On Blood thinners and told to hold : Coumadin (Warfarin) or Plavix No   Phentermine (diet pill) No      Prep required? Yes, Pharmacy is [id number] - [place], [place] - [address]   ; was request sent to nurse to send prep to pharmacy? Yes; Briefly reviewed? Yes;   If procedure is scheduled 7 days or less, patient was told to  prep letter?: n/a   Statement Selected

## 2023-06-07 ENCOUNTER — APPOINTMENT (OUTPATIENT)
Dept: FAMILY MEDICINE | Facility: CLINIC | Age: 33
End: 2023-06-07
Payer: MEDICAID

## 2023-06-07 VITALS
WEIGHT: 157 LBS | HEART RATE: 80 BPM | OXYGEN SATURATION: 98 % | DIASTOLIC BLOOD PRESSURE: 80 MMHG | BODY MASS INDEX: 31.65 KG/M2 | SYSTOLIC BLOOD PRESSURE: 116 MMHG | HEIGHT: 59 IN | TEMPERATURE: 97.9 F

## 2023-06-07 DIAGNOSIS — H66.91 OTITIS MEDIA, UNSPECIFIED, RIGHT EAR: ICD-10-CM

## 2023-06-07 DIAGNOSIS — F41.9 ANXIETY DISORDER, UNSPECIFIED: ICD-10-CM

## 2023-06-07 PROCEDURE — 99214 OFFICE O/P EST MOD 30 MIN: CPT

## 2023-06-07 RX ORDER — ALPRAZOLAM 0.25 MG/1
0.25 TABLET ORAL
Qty: 20 | Refills: 0 | Status: ACTIVE | COMMUNITY
Start: 2021-09-08 | End: 1900-01-01

## 2023-06-07 RX ORDER — FLUTICASONE PROPIONATE 50 UG/1
50 SPRAY, METERED NASAL TWICE DAILY
Qty: 1 | Refills: 0 | Status: COMPLETED | COMMUNITY
Start: 2022-10-17 | End: 2023-06-07

## 2023-06-07 NOTE — PHYSICAL EXAM
[No Acute Distress] : no acute distress [Well Developed] : well developed [No Lymphadenopathy] : no lymphadenopathy [Normal] : affect was normal and insight and judgment were intact [Normal Oropharynx] : the oropharynx was normal [No Edema] : there was no peripheral edema [No Extremity Clubbing/Cyanosis] : no extremity clubbing/cyanosis [de-identified] : right ear erythematous TM; sinus tenderness to palpation

## 2023-06-07 NOTE — REVIEW OF SYSTEMS
[Cough] : cough [Negative] : Genitourinary [Anxiety] : anxiety [Fever] : no fever [Chills] : no chills [Sore Throat] : no sore throat [Chest Pain] : no chest pain [Palpitations] : no palpitations [Wheezing] : no wheezing [Depression] : no depression [FreeTextEntry4] : congested, feels clogged

## 2023-06-07 NOTE — HISTORY OF PRESENT ILLNESS
[FreeTextEntry1] : Here for follow-up; needs med refills.\par  [de-identified] : Here for follow-up; needs med refills.\par \par Seeing Gyn in August 2023. \par \par Had Strep in March. \par Cough congestion for 1.5 weeks; mucinex helping.  \par \par Mood has been doing okay. \par

## 2023-06-12 ENCOUNTER — TRANSCRIPTION ENCOUNTER (OUTPATIENT)
Age: 33
End: 2023-06-12

## 2023-06-21 ENCOUNTER — NON-APPOINTMENT (OUTPATIENT)
Age: 33
End: 2023-06-21

## 2023-06-22 ENCOUNTER — NON-APPOINTMENT (OUTPATIENT)
Age: 33
End: 2023-06-22

## 2023-07-31 NOTE — ED ADULT NURSE NOTE - CHIEF COMPLAINT
Patient being seen for first appointment with physical therapy tomorrow morning for right pectoralis major repair on 6/6/2023. Physical therapy contacted office requesting therapy protocol. Please advise. The patient is a 27y Female complaining of

## 2023-09-12 NOTE — ED ADULT TRIAGE NOTE - BP NONINVASIVE DIASTOLIC (MM HG)
General Sunscreen Counseling: Educated on sun protection with sunscreen and clothing. Reapply if out for longer. Detail Level: Generalized 80 Patient
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
## 2023-09-25 NOTE — HISTORY OF PRESENT ILLNESS
What Type Of Note Output Would You Prefer (Optional)?: Standard Output What Is The Reason For Today's Visit?: Full Body Skin Examination What Is The Reason For Today's Visit? (Being Monitored For X): concerning skin lesions on a periodic basis [Home] : at home, [unfilled] , at the time of the visit. [Medical Office: ([facility])___] : at the medical office located in  [Verbal consent obtained from patient] : the patient, [unfilled] [de-identified] : 30 year old female here with complaints of 2 weeks progressive sinus pain and pressure.  Patients active medications, allergies and issues were all reviewed with the patient at time of visit.\par \par

## 2023-11-06 NOTE — DISCHARGE NOTE OB - MEDICATION SUMMARY - MEDICATIONS TO CHANGE
Pt here for covid vaccine. Given IM in L deltoid. Pt tolerated well. VISS given: YES . Pt was monitored for greater than 15 minutes for vaccine reactions. Dc'd in stable condition.    
I will SWITCH the dose or number of times a day I take the medications listed below when I get home from the hospital:  None

## 2023-11-21 ENCOUNTER — RX RENEWAL (OUTPATIENT)
Age: 33
End: 2023-11-21

## 2023-12-04 ENCOUNTER — NON-APPOINTMENT (OUTPATIENT)
Age: 33
End: 2023-12-04

## 2023-12-11 ENCOUNTER — NON-APPOINTMENT (OUTPATIENT)
Age: 33
End: 2023-12-11

## 2024-01-17 ENCOUNTER — APPOINTMENT (OUTPATIENT)
Dept: FAMILY MEDICINE | Facility: CLINIC | Age: 34
End: 2024-01-17
Payer: MEDICAID

## 2024-01-17 VITALS
HEART RATE: 88 BPM | HEIGHT: 59 IN | TEMPERATURE: 98.7 F | OXYGEN SATURATION: 96 % | DIASTOLIC BLOOD PRESSURE: 86 MMHG | SYSTOLIC BLOOD PRESSURE: 123 MMHG | BODY MASS INDEX: 33.47 KG/M2 | RESPIRATION RATE: 16 BRPM | WEIGHT: 166 LBS

## 2024-01-17 DIAGNOSIS — F32.A ANXIETY DISORDER, UNSPECIFIED: ICD-10-CM

## 2024-01-17 DIAGNOSIS — E78.2 MIXED HYPERLIPIDEMIA: ICD-10-CM

## 2024-01-17 DIAGNOSIS — R74.8 ABNORMAL LEVELS OF OTHER SERUM ENZYMES: ICD-10-CM

## 2024-01-17 DIAGNOSIS — R09.82 POSTNASAL DRIP: ICD-10-CM

## 2024-01-17 DIAGNOSIS — Z00.00 ENCOUNTER FOR GENERAL ADULT MEDICAL EXAMINATION W/OUT ABNORMAL FINDINGS: ICD-10-CM

## 2024-01-17 DIAGNOSIS — F41.9 ANXIETY DISORDER, UNSPECIFIED: ICD-10-CM

## 2024-01-17 PROCEDURE — 99395 PREV VISIT EST AGE 18-39: CPT | Mod: 25

## 2024-01-17 RX ORDER — AMOXICILLIN AND CLAVULANATE POTASSIUM 875; 125 MG/1; MG/1
875-125 TABLET, COATED ORAL
Qty: 14 | Refills: 0 | Status: DISCONTINUED | COMMUNITY
Start: 2023-06-07 | End: 2024-01-17

## 2024-01-17 NOTE — PLAN
[FreeTextEntry1] : 1. CPE  - routine blood work  - pap UTD  - declined flu vaccine  2. Post nasal  - flonase or antihistamines  3. HLD  - LF diet  -check lipid panel  - exercise  4. Anxiety - hx panic attacks has xanax PRN uses sparingly  - advised therapy - and life style changes, coping mechanisms; discussed augmentation therapy to SSRI - preserved for only refractory anxiety  - will fu if not improving and consider switching to possible SNRI

## 2024-01-17 NOTE — HISTORY OF PRESENT ILLNESS
[FreeTextEntry1] : CPE  [de-identified] : 32 yo F PMHx Anxiety presenting for CPE.  feels well. needed refills on zoloft. feels like she is leveling off has been on this dose for about 3 years. notes anxiety is there but manageable. she has trouble sleeping at night however drinks a lot of coffee and knows she needs to cut down.  had noticed her ears clogged since getting sick a week ago. no other new sxs.  lipid panel elevated october 2022, she walks and has tried cutting fats from her diet.  due for CPE - pt ok to convert visit.

## 2024-01-17 NOTE — HEALTH RISK ASSESSMENT
[Good] : ~his/her~  mood as  good [No] : No [0] : 2) Feeling down, depressed, or hopeless: Not at all (0) [PHQ-2 Negative - No further assessment needed] : PHQ-2 Negative - No further assessment needed [Patient reported PAP Smear was normal] : Patient reported PAP Smear was normal [Employed] : employed [] :  [# Of Children ___] : has [unfilled] children [Sexually Active] : sexually active [Feels Safe at Home] : Feels safe at home [Never] : Never [HIV test declined] : HIV test declined [Hepatitis C test declined] : Hepatitis C test declined [de-identified] : walking  [de-identified] : well balanced  [[id number]] : 0 [Reports changes in hearing] : Reports no changes in hearing [Reports changes in vision] : Reports no changes in vision [PapSmearDate] : 3/23

## 2024-01-18 LAB
ALBUMIN SERPL ELPH-MCNC: 4.8 G/DL
ALP BLD-CCNC: 85 U/L
ALT SERPL-CCNC: 29 U/L
ANION GAP SERPL CALC-SCNC: 11 MMOL/L
AST SERPL-CCNC: 22 U/L
BASOPHILS # BLD AUTO: 0.02 K/UL
BASOPHILS NFR BLD AUTO: 0.4 %
BILIRUB SERPL-MCNC: 0.3 MG/DL
BUN SERPL-MCNC: 18 MG/DL
CALCIUM SERPL-MCNC: 9.4 MG/DL
CHLORIDE SERPL-SCNC: 101 MMOL/L
CHOLEST SERPL-MCNC: 196 MG/DL
CO2 SERPL-SCNC: 29 MMOL/L
CREAT SERPL-MCNC: 0.79 MG/DL
EGFR: 101 ML/MIN/1.73M2
EOSINOPHIL # BLD AUTO: 0.09 K/UL
EOSINOPHIL NFR BLD AUTO: 1.7 %
ESTIMATED AVERAGE GLUCOSE: 108 MG/DL
GLUCOSE SERPL-MCNC: 80 MG/DL
HBA1C MFR BLD HPLC: 5.4 %
HCT VFR BLD CALC: 43.1 %
HDLC SERPL-MCNC: 52 MG/DL
HGB BLD-MCNC: 14 G/DL
IMM GRANULOCYTES NFR BLD AUTO: 0.4 %
LDLC SERPL CALC-MCNC: 118 MG/DL
LYMPHOCYTES # BLD AUTO: 1.6 K/UL
LYMPHOCYTES NFR BLD AUTO: 29.5 %
MAN DIFF?: NORMAL
MCHC RBC-ENTMCNC: 27.3 PG
MCHC RBC-ENTMCNC: 32.5 GM/DL
MCV RBC AUTO: 84.2 FL
MONOCYTES # BLD AUTO: 0.48 K/UL
MONOCYTES NFR BLD AUTO: 8.8 %
NEUTROPHILS # BLD AUTO: 3.22 K/UL
NEUTROPHILS NFR BLD AUTO: 59.2 %
NONHDLC SERPL-MCNC: 145 MG/DL
PLATELET # BLD AUTO: 258 K/UL
POTASSIUM SERPL-SCNC: 4.4 MMOL/L
PROT SERPL-MCNC: 6.8 G/DL
RBC # BLD: 5.12 M/UL
RBC # FLD: 14.3 %
SODIUM SERPL-SCNC: 141 MMOL/L
TRIGL SERPL-MCNC: 154 MG/DL
TSH SERPL-ACNC: 2.13 UIU/ML
WBC # FLD AUTO: 5.43 K/UL

## 2024-01-26 ENCOUNTER — APPOINTMENT (OUTPATIENT)
Dept: FAMILY MEDICINE | Facility: CLINIC | Age: 34
End: 2024-01-26
Payer: MEDICAID

## 2024-01-26 ENCOUNTER — NON-APPOINTMENT (OUTPATIENT)
Age: 34
End: 2024-01-26

## 2024-01-26 DIAGNOSIS — J02.9 ACUTE PHARYNGITIS, UNSPECIFIED: ICD-10-CM

## 2024-01-26 PROCEDURE — 99213 OFFICE O/P EST LOW 20 MIN: CPT

## 2024-02-01 ENCOUNTER — RX RENEWAL (OUTPATIENT)
Age: 34
End: 2024-02-01

## 2024-02-02 ENCOUNTER — TRANSCRIPTION ENCOUNTER (OUTPATIENT)
Age: 34
End: 2024-02-02

## 2024-02-02 PROBLEM — J02.9 SORE THROAT: Status: ACTIVE | Noted: 2024-02-02

## 2024-02-02 RX ORDER — AMOXICILLIN AND CLAVULANATE POTASSIUM 875; 125 MG/1; MG/1
875-125 TABLET, COATED ORAL TWICE DAILY
Qty: 20 | Refills: 0 | Status: ACTIVE | COMMUNITY
Start: 2024-02-02 | End: 1900-01-01

## 2024-02-02 NOTE — HISTORY OF PRESENT ILLNESS
[Home] : at home, [unfilled] , at the time of the visit. 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  Kids have strep throat.

## 2024-04-02 ENCOUNTER — RX RENEWAL (OUTPATIENT)
Age: 34
End: 2024-04-02

## 2024-05-28 ENCOUNTER — RX RENEWAL (OUTPATIENT)
Age: 34
End: 2024-05-28

## 2024-05-28 RX ORDER — SERTRALINE HYDROCHLORIDE 100 MG/1
100 TABLET, FILM COATED ORAL
Qty: 60 | Refills: 0 | Status: ACTIVE | COMMUNITY
Start: 2022-01-11 | End: 1900-01-01

## 2024-08-20 ENCOUNTER — APPOINTMENT (OUTPATIENT)
Dept: FAMILY MEDICINE | Facility: CLINIC | Age: 34
End: 2024-08-20
Payer: MEDICAID

## 2024-08-20 VITALS
SYSTOLIC BLOOD PRESSURE: 115 MMHG | WEIGHT: 163 LBS | OXYGEN SATURATION: 97 % | BODY MASS INDEX: 32.86 KG/M2 | HEIGHT: 59 IN | RESPIRATION RATE: 16 BRPM | HEART RATE: 85 BPM | DIASTOLIC BLOOD PRESSURE: 82 MMHG | TEMPERATURE: 97.7 F

## 2024-08-20 DIAGNOSIS — E78.2 MIXED HYPERLIPIDEMIA: ICD-10-CM

## 2024-08-20 DIAGNOSIS — R35.0 FREQUENCY OF MICTURITION: ICD-10-CM

## 2024-08-20 DIAGNOSIS — F41.9 ANXIETY DISORDER, UNSPECIFIED: ICD-10-CM

## 2024-08-20 LAB
BILIRUB UR QL STRIP: NEGATIVE
CLARITY UR: NORMAL
COLLECTION METHOD: NORMAL
GLUCOSE UR-MCNC: NEGATIVE
HCG UR QL: 0.2 EU/DL
HGB UR QL STRIP.AUTO: NORMAL
KETONES UR-MCNC: NEGATIVE
LEUKOCYTE ESTERASE UR QL STRIP: NEGATIVE
NITRITE UR QL STRIP: NEGATIVE
PH UR STRIP: 6
PROT UR STRIP-MCNC: NEGATIVE
SP GR UR STRIP: 1.02

## 2024-08-20 PROCEDURE — 99214 OFFICE O/P EST MOD 30 MIN: CPT | Mod: 25

## 2024-08-20 PROCEDURE — 81002 URINALYSIS NONAUTO W/O SCOPE: CPT

## 2024-08-20 NOTE — REVIEW OF SYSTEMS
[Frequency] : frequency [Insomnia] : insomnia [Anxiety] : anxiety [Negative] : Gastrointestinal [Fever] : no fever [Chills] : no chills [Chest Pain] : no chest pain [Palpitations] : no palpitations [Dysuria] : no dysuria [Vaginal Discharge] : no vaginal discharge [Back Pain] : no back pain [Headache] : no headache [Dizziness] : no dizziness [Depression] : no depression [FreeTextEntry8] : on cycle currently  [de-identified] : not sleeping well. feeling tired in the morning.

## 2024-08-20 NOTE — PLAN
[FreeTextEntry1] : Anxiety-discussed establishing with therapist-[facility]-referral placed.  Patient agreeable-has not met with therapist in the past.  Discussed use of SSRIs, that they need to be taken regularly and may take a few weeks for effects.  Advised not to stop medication suddenly.  Advised if any worsening symptoms to contact our office.  Patient expressed understanding. Discussed tapering Zoloft, establishing with therapy., consider starting buspar. Counseling regarding increased sleep; reducing caffeine intake to help with anxiety. Sleep Hygiene discussed, consistent sleep and wake schedule.  check lipids, cmp, tsh  UA only showing blood-patient currently on cycle. Will send for culture. Advised to stay hydrated. If worsening symptoms- fever/chills/back pain develop, advised to seek immediate medical evaluation.  Urine culture sent.

## 2024-08-20 NOTE — PHYSICAL EXAM
[No Acute Distress] : no acute distress [Well-Appearing] : well-appearing [Normal Oropharynx] : the oropharynx was normal [No Lymphadenopathy] : no lymphadenopathy [No Edema] : there was no peripheral edema [No Extremity Clubbing/Cyanosis] : no extremity clubbing/cyanosis [No CVA Tenderness] : no CVA  tenderness [Normal] : affect was normal and insight and judgment were intact

## 2024-08-20 NOTE — HISTORY OF PRESENT ILLNESS
[FreeTextEntry1] : Here for follow-up; needs med refills. [de-identified] : Here for follow-up; needs med refills.  Has needed 1 Xanax every 2 weeks.  Feels like anxiety level isn't doing as well on Sertraline now.   On menstrual cycle currently-day 5.  Two days of Urinary frequency.   Not working with therapist currently-has not met with therapist in the past.  Has been on sertraline for ~5 years.  Tried lexapro previously.   5 hours of sleep per night. 4 cups of coffee on average.  Two young children-will be starting school in the fall.   Has been stressed- 4 yo had ear tubes put in and adenoids removed.   More anxiety in general; still having panic attacks.

## 2024-08-20 NOTE — REVIEW OF SYSTEMS
[Frequency] : frequency [Insomnia] : insomnia [Anxiety] : anxiety [Negative] : Gastrointestinal [Fever] : no fever [Chills] : no chills [Chest Pain] : no chest pain [Palpitations] : no palpitations [Dysuria] : no dysuria [Vaginal Discharge] : no vaginal discharge [Back Pain] : no back pain [Headache] : no headache [Dizziness] : no dizziness [Depression] : no depression [FreeTextEntry8] : on cycle currently  [de-identified] : not sleeping well. feeling tired in the morning.

## 2024-08-20 NOTE — HISTORY OF PRESENT ILLNESS
[FreeTextEntry1] : Here for follow-up; needs med refills. [de-identified] : Here for follow-up; needs med refills.  Has needed 1 Xanax every 2 weeks.  Feels like anxiety level isn't doing as well on Sertraline now.   On menstrual cycle currently-day 5.  Two days of Urinary frequency.   Not working with therapist currently-has not met with therapist in the past.  Has been on sertraline for ~5 years.  Tried lexapro previously.   5 hours of sleep per night. 4 cups of coffee on average.  Two young children-will be starting school in the fall.   Has been stressed- 6 yo had ear tubes put in and adenoids removed.   More anxiety in general; still having panic attacks.

## 2024-08-26 LAB — BACTERIA UR CULT: NORMAL

## 2024-08-30 ENCOUNTER — TRANSCRIPTION ENCOUNTER (OUTPATIENT)
Age: 34
End: 2024-08-30

## 2024-09-03 ENCOUNTER — APPOINTMENT (OUTPATIENT)
Dept: FAMILY MEDICINE | Facility: CLINIC | Age: 34
End: 2024-09-03

## 2024-09-04 ENCOUNTER — TRANSCRIPTION ENCOUNTER (OUTPATIENT)
Age: 34
End: 2024-09-04

## 2024-09-04 ENCOUNTER — APPOINTMENT (OUTPATIENT)
Dept: PSYCHIATRY | Facility: TELEHEALTH | Age: 34
End: 2024-09-04
Payer: MEDICAID

## 2024-09-04 PROCEDURE — 90791 PSYCH DIAGNOSTIC EVALUATION: CPT | Mod: 95

## 2024-09-05 ENCOUNTER — TRANSCRIPTION ENCOUNTER (OUTPATIENT)
Age: 34
End: 2024-09-05

## 2024-10-02 ENCOUNTER — RX RENEWAL (OUTPATIENT)
Age: 34
End: 2024-10-02

## 2024-10-20 ENCOUNTER — NON-APPOINTMENT (OUTPATIENT)
Age: 34
End: 2024-10-20

## 2024-10-21 ENCOUNTER — APPOINTMENT (OUTPATIENT)
Dept: INTERNAL MEDICINE | Facility: CLINIC | Age: 34
End: 2024-10-21
Payer: COMMERCIAL

## 2024-10-21 DIAGNOSIS — R50.9 FEVER, UNSPECIFIED: ICD-10-CM

## 2024-10-21 PROCEDURE — 99202 OFFICE O/P NEW SF 15 MIN: CPT

## 2025-03-06 ENCOUNTER — APPOINTMENT (OUTPATIENT)
Dept: FAMILY MEDICINE | Facility: CLINIC | Age: 35
End: 2025-03-06
Payer: MEDICAID

## 2025-03-06 DIAGNOSIS — F41.9 ANXIETY DISORDER, UNSPECIFIED: ICD-10-CM

## 2025-03-06 DIAGNOSIS — F32.A ANXIETY DISORDER, UNSPECIFIED: ICD-10-CM

## 2025-03-06 PROCEDURE — 99213 OFFICE O/P EST LOW 20 MIN: CPT | Mod: 93

## 2025-03-06 RX ORDER — HYDROXYZINE HYDROCHLORIDE 25 MG/1
25 TABLET ORAL
Qty: 60 | Refills: 1 | Status: ACTIVE | COMMUNITY
Start: 2025-03-06 | End: 1900-01-01

## 2025-07-02 ENCOUNTER — RX RENEWAL (OUTPATIENT)
Age: 35
End: 2025-07-02

## 2025-07-31 ENCOUNTER — RX RENEWAL (OUTPATIENT)
Age: 35
End: 2025-07-31

## 2025-08-28 ENCOUNTER — RX RENEWAL (OUTPATIENT)
Age: 35
End: 2025-08-28